# Patient Record
Sex: FEMALE | Race: WHITE | NOT HISPANIC OR LATINO
[De-identification: names, ages, dates, MRNs, and addresses within clinical notes are randomized per-mention and may not be internally consistent; named-entity substitution may affect disease eponyms.]

---

## 2019-01-17 ENCOUNTER — RECORD ABSTRACTING (OUTPATIENT)
Age: 59
End: 2019-01-17

## 2019-01-17 DIAGNOSIS — Z82.49 FAMILY HISTORY OF ISCHEMIC HEART DISEASE AND OTHER DISEASES OF THE CIRCULATORY SYSTEM: ICD-10-CM

## 2019-01-17 DIAGNOSIS — H40.20X0 UNSPECIFIED PRIMARY ANGLE-CLOSURE GLAUCOMA, STAGE UNSPECIFIED: ICD-10-CM

## 2019-01-17 DIAGNOSIS — G47.00 INSOMNIA, UNSPECIFIED: ICD-10-CM

## 2019-01-17 DIAGNOSIS — Z87.440 PERSONAL HISTORY OF URINARY (TRACT) INFECTIONS: ICD-10-CM

## 2019-01-17 DIAGNOSIS — M25.559 PAIN IN UNSPECIFIED HIP: ICD-10-CM

## 2019-01-17 DIAGNOSIS — Z82.3 FAMILY HISTORY OF STROKE: ICD-10-CM

## 2019-01-17 DIAGNOSIS — D17.30 BENIGN LIPOMATOUS NEOPLASM OF SKIN AND SUBCUTANEOUS TISSUE OF UNSPECIFIED SITES: ICD-10-CM

## 2019-01-18 ENCOUNTER — APPOINTMENT (OUTPATIENT)
Dept: INTERNAL MEDICINE | Facility: CLINIC | Age: 59
End: 2019-01-18
Payer: COMMERCIAL

## 2019-01-18 VITALS — WEIGHT: 138 LBS | HEIGHT: 64 IN | BODY MASS INDEX: 23.56 KG/M2

## 2019-01-18 DIAGNOSIS — L98.7 EXCESSIVE AND REDUNDANT SKIN AND SUBCUTANEOUS TISSUE: ICD-10-CM

## 2019-01-18 DIAGNOSIS — H54.7 UNSPECIFIED VISUAL LOSS: ICD-10-CM

## 2019-01-18 DIAGNOSIS — Z01.818 ENCOUNTER FOR OTHER PREPROCEDURAL EXAMINATION: ICD-10-CM

## 2019-01-18 PROCEDURE — 99244 OFF/OP CNSLTJ NEW/EST MOD 40: CPT

## 2019-01-18 RX ORDER — ALENDRONATE SODIUM 70 MG/1
70 TABLET ORAL
Refills: 0 | Status: COMPLETED | COMMUNITY
End: 2019-01-18

## 2019-01-20 ENCOUNTER — RX RENEWAL (OUTPATIENT)
Age: 59
End: 2019-01-20

## 2019-07-21 ENCOUNTER — APPOINTMENT (OUTPATIENT)
Dept: INTERNAL MEDICINE | Facility: CLINIC | Age: 59
End: 2019-07-21
Payer: COMMERCIAL

## 2019-07-21 PROCEDURE — 99213 OFFICE O/P EST LOW 20 MIN: CPT

## 2019-07-21 RX ORDER — TOBRAMYCIN AND DEXAMETHASONE 3; 1 MG/G; MG/G
0.3-0.1 OINTMENT OPHTHALMIC
Qty: 4 | Refills: 0 | Status: DISCONTINUED | COMMUNITY
Start: 2019-01-24

## 2019-07-21 NOTE — HISTORY OF PRESENT ILLNESS
[FreeTextEntry1] : last  3 days has pulling  feeling over bladder area and has urunary frequency.  no def dysuria no fever no cva tenderness

## 2019-07-21 NOTE — PHYSICAL EXAM
[No Acute Distress] : no acute distress [Well Nourished] : well nourished [Well Developed] : well developed [No JVD] : no jugular venous distention [No Respiratory Distress] : no respiratory distress  [No Accessory Muscle Use] : no accessory muscle use [Clear to Auscultation] : lungs were clear to auscultation bilaterally [Normal Rate] : normal rate  [Regular Rhythm] : with a regular rhythm [Normal S1, S2] : normal S1 and S2 [Soft] : abdomen soft [Non Tender] : non-tender [Normal] : affect was normal and insight and judgment were intact [No CVA Tenderness] : no CVA  tenderness

## 2019-07-22 ENCOUNTER — TRANSCRIPTION ENCOUNTER (OUTPATIENT)
Age: 59
End: 2019-07-22

## 2019-07-24 LAB — BACTERIA UR CULT: ABNORMAL

## 2019-08-06 ENCOUNTER — MEDICATION RENEWAL (OUTPATIENT)
Age: 59
End: 2019-08-06

## 2020-08-18 ENCOUNTER — APPOINTMENT (OUTPATIENT)
Dept: INTERNAL MEDICINE | Facility: CLINIC | Age: 60
End: 2020-08-18
Payer: COMMERCIAL

## 2020-08-18 VITALS — BODY MASS INDEX: 22.71 KG/M2 | HEIGHT: 64 IN | HEART RATE: 83 BPM | OXYGEN SATURATION: 98 % | WEIGHT: 133 LBS

## 2020-08-18 VITALS — DIASTOLIC BLOOD PRESSURE: 76 MMHG | SYSTOLIC BLOOD PRESSURE: 120 MMHG

## 2020-08-18 PROCEDURE — 99214 OFFICE O/P EST MOD 30 MIN: CPT

## 2020-08-18 RX ORDER — NITROFURANTOIN MACROCRYSTALS 100 MG/1
100 CAPSULE ORAL
Qty: 180 | Refills: 0 | Status: COMPLETED | COMMUNITY
Start: 2019-07-21 | End: 2020-08-18

## 2020-08-18 NOTE — ASSESSMENT
[FreeTextEntry1] : normal exam\par hair thinning of uncertain cause\par possibly genetic/postmenopausal plus severe stress and lack of sleep in March 2020\par idiopathic lymphedema

## 2020-08-18 NOTE — PLAN
[FreeTextEntry1] : check bloods\par topical minoxidil\par derm follow up if no change for scalp biopsy

## 2020-08-18 NOTE — HISTORY OF PRESENT ILLNESS
[de-identified] : saw gyn, mentioned this, told it was stress\par saw lev Busch, he told her it could be systemic illness, recommended Rogaine\par postmenopausal 3 to 4 years ago\par had tremendous stress in March\par ROS negative [FreeTextEntry1] : hair thinning and hair loss

## 2020-08-18 NOTE — PHYSICAL EXAM
[No Acute Distress] : no acute distress [Well Developed] : well developed [Well-Appearing] : well-appearing [Well Nourished] : well nourished [PERRL] : pupils equal round and reactive to light [Normal Sclera/Conjunctiva] : normal sclera/conjunctiva [EOMI] : extraocular movements intact [Normal Outer Ear/Nose] : the outer ears and nose were normal in appearance [Normal Oropharynx] : the oropharynx was normal [No JVD] : no jugular venous distention [No Lymphadenopathy] : no lymphadenopathy [No Respiratory Distress] : no respiratory distress  [Thyroid Normal, No Nodules] : the thyroid was normal and there were no nodules present [No Accessory Muscle Use] : no accessory muscle use [Supple] : supple [Clear to Auscultation] : lungs were clear to auscultation bilaterally [Regular Rhythm] : with a regular rhythm [Normal Rate] : normal rate  [Normal S1, S2] : normal S1 and S2 [No Murmur] : no murmur heard [No Carotid Bruits] : no carotid bruits [Pedal Pulses Present] : the pedal pulses are present [No Abdominal Bruit] : a ~M bruit was not heard ~T in the abdomen [No Varicosities] : no varicosities [No Edema] : there was no peripheral edema [Soft] : abdomen soft [No Extremity Clubbing/Cyanosis] : no extremity clubbing/cyanosis [No Palpable Aorta] : no palpable aorta [Non Tender] : non-tender [No Masses] : no abdominal mass palpated [Non-distended] : non-distended [Normal Bowel Sounds] : normal bowel sounds [No HSM] : no HSM [Normal Supraclavicular Nodes] : no supraclavicular lymphadenopathy [Normal Anterior Cervical Nodes] : no anterior cervical lymphadenopathy [Normal Axillary Nodes] : no axillary lymphadenopathy [Normal Posterior Cervical Nodes] : no posterior cervical lymphadenopathy [Normal Femoral Nodes] : no femoral lymphadenopathy [No CVA Tenderness] : no CVA  tenderness [Normal Inguinal Nodes] : no inguinal lymphadenopathy [No Spinal Tenderness] : no spinal tenderness [No Joint Swelling] : no joint swelling [No Rash] : no rash [Grossly Normal Strength/Tone] : grossly normal strength/tone [Coordination Grossly Intact] : coordination grossly intact [No Focal Deficits] : no focal deficits [Normal Affect] : the affect was normal [Normal Gait] : normal gait [Normal Insight/Judgement] : insight and judgment were intact [de-identified] : no alopecia

## 2020-08-21 LAB
25(OH)D3 SERPL-MCNC: 36.8 NG/ML
ALBUMIN SERPL ELPH-MCNC: 5.2 G/DL
ALP BLD-CCNC: 72 U/L
ALT SERPL-CCNC: 16 U/L
ANA SER IF-ACNC: NEGATIVE
ANACR T: NEGATIVE
ANION GAP SERPL CALC-SCNC: 12 MMOL/L
AST SERPL-CCNC: 19 U/L
BASOPHILS # BLD AUTO: 0.04 K/UL
BASOPHILS NFR BLD AUTO: 0.6 %
BILIRUB SERPL-MCNC: 0.3 MG/DL
BUN SERPL-MCNC: 17 MG/DL
CALCIUM SERPL-MCNC: 10 MG/DL
CHLORIDE SERPL-SCNC: 102 MMOL/L
CO2 SERPL-SCNC: 27 MMOL/L
CREAT SERPL-MCNC: 0.69 MG/DL
EOSINOPHIL # BLD AUTO: 0.13 K/UL
EOSINOPHIL NFR BLD AUTO: 1.9 %
ESTIMATED AVERAGE GLUCOSE: 114 MG/DL
GLUCOSE SERPL-MCNC: 100 MG/DL
HBA1C MFR BLD HPLC: 5.6 %
HCT VFR BLD CALC: 47.2 %
HGB BLD-MCNC: 14.8 G/DL
IMM GRANULOCYTES NFR BLD AUTO: 0.3 %
IRON SATN MFR SERPL: 31 %
IRON SERPL-MCNC: 108 UG/DL
LYMPHOCYTES # BLD AUTO: 2.59 K/UL
LYMPHOCYTES NFR BLD AUTO: 37.5 %
MAN DIFF?: NORMAL
MCHC RBC-ENTMCNC: 29.1 PG
MCHC RBC-ENTMCNC: 31.4 GM/DL
MCV RBC AUTO: 92.9 FL
MONOCYTES # BLD AUTO: 0.69 K/UL
MONOCYTES NFR BLD AUTO: 10 %
NEUTROPHILS # BLD AUTO: 3.43 K/UL
NEUTROPHILS NFR BLD AUTO: 49.7 %
PLATELET # BLD AUTO: 302 K/UL
POTASSIUM SERPL-SCNC: 4.8 MMOL/L
PROT SERPL-MCNC: 6.9 G/DL
RBC # BLD: 5.08 M/UL
RBC # FLD: 12.7 %
SODIUM SERPL-SCNC: 141 MMOL/L
T4 FREE SERPL-MCNC: 1.1 NG/DL
TIBC SERPL-MCNC: 350 UG/DL
UIBC SERPL-MCNC: 242 UG/DL
VIT B12 SERPL-MCNC: 442 PG/ML
WBC # FLD AUTO: 6.9 K/UL

## 2020-09-01 LAB
TESTOST BND SERPL-MCNC: 1.8 PG/ML
TESTOST SERPL-MCNC: 19.3 NG/DL

## 2020-10-15 ENCOUNTER — NON-APPOINTMENT (OUTPATIENT)
Age: 60
End: 2020-10-15

## 2020-10-15 ENCOUNTER — APPOINTMENT (OUTPATIENT)
Dept: INTERNAL MEDICINE | Facility: CLINIC | Age: 60
End: 2020-10-15
Payer: COMMERCIAL

## 2020-10-15 VITALS — HEIGHT: 62.5 IN | WEIGHT: 133 LBS | BODY MASS INDEX: 23.86 KG/M2

## 2020-10-15 VITALS — SYSTOLIC BLOOD PRESSURE: 118 MMHG | DIASTOLIC BLOOD PRESSURE: 76 MMHG

## 2020-10-15 DIAGNOSIS — Z23 ENCOUNTER FOR IMMUNIZATION: ICD-10-CM

## 2020-10-15 PROCEDURE — 93000 ELECTROCARDIOGRAM COMPLETE: CPT

## 2020-10-15 PROCEDURE — 81003 URINALYSIS AUTO W/O SCOPE: CPT | Mod: QW

## 2020-10-15 PROCEDURE — 36415 COLL VENOUS BLD VENIPUNCTURE: CPT

## 2020-10-15 PROCEDURE — 99396 PREV VISIT EST AGE 40-64: CPT | Mod: 25

## 2020-10-15 RX ORDER — FLUCONAZOLE 200 MG/1
200 TABLET ORAL
Qty: 1 | Refills: 0 | Status: COMPLETED | COMMUNITY
Start: 2019-07-21 | End: 2020-10-15

## 2020-10-15 NOTE — HISTORY OF PRESENT ILLNESS
[de-identified] : hair loss worked up here and at dermatologist\par telogen effluvium on scalp biopsy\par using minoxidil topical\par left ear fluttering, worse in afternoon or evening\par One history of lymphedema for which she wears support stockings and gets compression treatments by a lymphedema specialist\par Past history melanoma closely followed by dermatologist\par Regular gynecologic exams with \par hx microscopic hematuria negative urologic workup 2016 [FreeTextEntry1] : here for comprehensive examination and with multiple complaints

## 2020-10-15 NOTE — HEALTH RISK ASSESSMENT
[0] : 2) Feeling down, depressed, or hopeless: Not at all (0) [Patient reported mammogram was normal] : Patient reported mammogram was normal [Patient reported colonoscopy was normal] : Patient reported colonoscopy was normal [Patient reported PAP Smear was normal] : Patient reported PAP Smear was normal [Patient reported bone density results were abnormal] : Patient reported bone density results were abnormal [MammogramDate] : 8/2020 [[id number]] : 0 [ColonoscopyDate] : 3/2014 [BoneDensityDate] : 8/2019 [PapSmearDate] : 8/2020

## 2020-10-15 NOTE — ASSESSMENT
[FreeTextEntry1] : healthy well managed lymphedema\par Some improvement of her thinning with topical minoxidil but side effect of some facial hair required her to reduce the dose\par Scalp biopsy confirmed diagnosis

## 2020-10-15 NOTE — PHYSICAL EXAM
[Well Nourished] : well nourished [Well Developed] : well developed [No Acute Distress] : no acute distress [Normal Sclera/Conjunctiva] : normal sclera/conjunctiva [Well-Appearing] : well-appearing [Normal Outer Ear/Nose] : the outer ears and nose were normal in appearance [Supple] : supple [No JVD] : no jugular venous distention [No Lymphadenopathy] : no lymphadenopathy [Thyroid Normal, No Nodules] : the thyroid was normal and there were no nodules present [No Respiratory Distress] : no respiratory distress  [Clear to Auscultation] : lungs were clear to auscultation bilaterally [No Accessory Muscle Use] : no accessory muscle use [Normal Rate] : normal rate  [Normal S1, S2] : normal S1 and S2 [Regular Rhythm] : with a regular rhythm [No Murmur] : no murmur heard [No Carotid Bruits] : no carotid bruits [No Abdominal Bruit] : a ~M bruit was not heard ~T in the abdomen [No Varicosities] : no varicosities [Pedal Pulses Present] : the pedal pulses are present [No Edema] : there was no peripheral edema [No Palpable Aorta] : no palpable aorta [No Extremity Clubbing/Cyanosis] : no extremity clubbing/cyanosis [Soft] : abdomen soft [Non-distended] : non-distended [Non Tender] : non-tender [No Masses] : no abdominal mass palpated [No HSM] : no HSM [Normal Bowel Sounds] : normal bowel sounds [Normal Supraclavicular Nodes] : no supraclavicular lymphadenopathy [Normal Axillary Nodes] : no axillary lymphadenopathy [Normal Inguinal Nodes] : no inguinal lymphadenopathy [Normal Posterior Cervical Nodes] : no posterior cervical lymphadenopathy [Normal Anterior Cervical Nodes] : no anterior cervical lymphadenopathy [No Spinal Tenderness] : no spinal tenderness [No CVA Tenderness] : no CVA  tenderness [Normal Femoral Nodes] : no femoral lymphadenopathy [No Joint Swelling] : no joint swelling [No Rash] : no rash [Grossly Normal Strength/Tone] : grossly normal strength/tone [Normal Gait] : normal gait [Coordination Grossly Intact] : coordination grossly intact [No Focal Deficits] : no focal deficits [Normal Affect] : the affect was normal [Normal Insight/Judgement] : insight and judgment were intact [de-identified] : no alopecia

## 2020-10-16 LAB
25(OH)D3 SERPL-MCNC: 31.2 NG/ML
ALBUMIN SERPL ELPH-MCNC: 4.8 G/DL
ALP BLD-CCNC: 90 U/L
ALT SERPL-CCNC: 85 U/L
ANION GAP SERPL CALC-SCNC: 13 MMOL/L
APPEARANCE: CLEAR
AST SERPL-CCNC: 44 U/L
BACTERIA: NEGATIVE
BASOPHILS # BLD AUTO: 0.03 K/UL
BASOPHILS NFR BLD AUTO: 0.6 %
BILIRUB SERPL-MCNC: 0.4 MG/DL
BILIRUBIN URINE: NEGATIVE
BLOOD URINE: NEGATIVE
BUN SERPL-MCNC: 9 MG/DL
CALCIUM SERPL-MCNC: 9.6 MG/DL
CHLORIDE SERPL-SCNC: 100 MMOL/L
CHOLEST SERPL-MCNC: 202 MG/DL
CHOLEST/HDLC SERPL: 2.6 RATIO
CK SERPL-CCNC: 52 U/L
CO2 SERPL-SCNC: 26 MMOL/L
COLOR: NORMAL
CREAT SERPL-MCNC: 0.62 MG/DL
EOSINOPHIL # BLD AUTO: 0.13 K/UL
EOSINOPHIL NFR BLD AUTO: 2.5 %
ESTIMATED AVERAGE GLUCOSE: 120 MG/DL
GLUCOSE QUALITATIVE U: NEGATIVE
GLUCOSE SERPL-MCNC: 98 MG/DL
HBA1C MFR BLD HPLC: 5.8 %
HCT VFR BLD CALC: 44.8 %
HDLC SERPL-MCNC: 76 MG/DL
HGB BLD-MCNC: 14 G/DL
HYALINE CASTS: 0 /LPF
IMM GRANULOCYTES NFR BLD AUTO: 0.4 %
KETONES URINE: NEGATIVE
LDLC SERPL CALC-MCNC: 102 MG/DL
LEUKOCYTE ESTERASE URINE: NEGATIVE
LYMPHOCYTES # BLD AUTO: 1.86 K/UL
LYMPHOCYTES NFR BLD AUTO: 35.6 %
MAN DIFF?: NORMAL
MCHC RBC-ENTMCNC: 28.7 PG
MCHC RBC-ENTMCNC: 31.3 GM/DL
MCV RBC AUTO: 92 FL
MICROSCOPIC-UA: NORMAL
MONOCYTES # BLD AUTO: 0.42 K/UL
MONOCYTES NFR BLD AUTO: 8 %
NEUTROPHILS # BLD AUTO: 2.76 K/UL
NEUTROPHILS NFR BLD AUTO: 52.9 %
NITRITE URINE: NEGATIVE
PH URINE: 7
PLATELET # BLD AUTO: 334 K/UL
POTASSIUM SERPL-SCNC: 4.5 MMOL/L
PROT SERPL-MCNC: 6.9 G/DL
PROTEIN URINE: NEGATIVE
RBC # BLD: 4.87 M/UL
RBC # FLD: 12.7 %
RED BLOOD CELLS URINE: 1 /HPF
SARS-COV-2 IGG SERPL IA-ACNC: <0.1 INDEX
SARS-COV-2 IGG SERPL QL IA: NEGATIVE
SODIUM SERPL-SCNC: 140 MMOL/L
SPECIFIC GRAVITY URINE: 1.01
SQUAMOUS EPITHELIAL CELLS: 0 /HPF
T4 FREE SERPL-MCNC: 1 NG/DL
TRIGL SERPL-MCNC: 121 MG/DL
TSH SERPL-ACNC: 4.39 UIU/ML
UROBILINOGEN URINE: NORMAL
VIT B12 SERPL-MCNC: 485 PG/ML
WBC # FLD AUTO: 5.22 K/UL
WHITE BLOOD CELLS URINE: 1 /HPF

## 2020-10-19 LAB
ALBUMIN SERPL ELPH-MCNC: 4.8 G/DL
ALP BLD-CCNC: 91 U/L
ALT SERPL-CCNC: 81 U/L
ANION GAP SERPL CALC-SCNC: 22 MMOL/L
AST SERPL-CCNC: 52 U/L
BILIRUB SERPL-MCNC: 0.3 MG/DL
BUN SERPL-MCNC: 9 MG/DL
CALCIUM SERPL-MCNC: 10 MG/DL
CHLORIDE SERPL-SCNC: 102 MMOL/L
CO2 SERPL-SCNC: 20 MMOL/L
CREAT SERPL-MCNC: 0.63 MG/DL
GLUCOSE SERPL-MCNC: 104 MG/DL
HAV IGM SER QL: NONREACTIVE
HBV CORE IGM SER QL: NONREACTIVE
HBV SURFACE AG SER QL: NONREACTIVE
HCV AB SER QL: NONREACTIVE
HCV S/CO RATIO: 0.07 S/CO
POTASSIUM SERPL-SCNC: 4.7 MMOL/L
PROT SERPL-MCNC: 7 G/DL
SODIUM SERPL-SCNC: 144 MMOL/L

## 2020-11-13 ENCOUNTER — LABORATORY RESULT (OUTPATIENT)
Age: 60
End: 2020-11-13

## 2022-01-21 ENCOUNTER — NON-APPOINTMENT (OUTPATIENT)
Age: 62
End: 2022-01-21

## 2022-01-21 ENCOUNTER — APPOINTMENT (OUTPATIENT)
Dept: INTERNAL MEDICINE | Facility: CLINIC | Age: 62
End: 2022-01-21
Payer: COMMERCIAL

## 2022-01-21 VITALS — SYSTOLIC BLOOD PRESSURE: 122 MMHG | DIASTOLIC BLOOD PRESSURE: 80 MMHG

## 2022-01-21 VITALS
HEART RATE: 60 BPM | DIASTOLIC BLOOD PRESSURE: 88 MMHG | WEIGHT: 133 LBS | SYSTOLIC BLOOD PRESSURE: 144 MMHG | HEIGHT: 62.5 IN | BODY MASS INDEX: 23.86 KG/M2 | TEMPERATURE: 97.7 F | OXYGEN SATURATION: 98 %

## 2022-01-21 DIAGNOSIS — R31.29 OTHER MICROSCOPIC HEMATURIA: ICD-10-CM

## 2022-01-21 LAB
BILIRUB UR QL STRIP: NEGATIVE
CLARITY UR: CLEAR
COLLECTION METHOD: NORMAL
GLUCOSE UR-MCNC: NEGATIVE
HCG UR QL: 0.2 EU/DL
HGB UR QL STRIP.AUTO: NORMAL
KETONES UR-MCNC: NEGATIVE
LEUKOCYTE ESTERASE UR QL STRIP: NORMAL
NITRITE UR QL STRIP: NEGATIVE
PH UR STRIP: 7
PROT UR STRIP-MCNC: NEGATIVE
SP GR UR STRIP: 1.01

## 2022-01-21 PROCEDURE — 99396 PREV VISIT EST AGE 40-64: CPT | Mod: 25

## 2022-01-21 PROCEDURE — 81003 URINALYSIS AUTO W/O SCOPE: CPT | Mod: QW

## 2022-01-21 PROCEDURE — G0442 ANNUAL ALCOHOL SCREEN 15 MIN: CPT

## 2022-01-21 PROCEDURE — G0444 DEPRESSION SCREEN ANNUAL: CPT | Mod: 59

## 2022-01-21 PROCEDURE — 93000 ELECTROCARDIOGRAM COMPLETE: CPT | Mod: 59

## 2022-01-21 PROCEDURE — 36415 COLL VENOUS BLD VENIPUNCTURE: CPT

## 2022-01-21 RX ORDER — ALENDRONATE SODIUM 70 MG/1
70 TABLET ORAL
Qty: 13 | Refills: 3 | Status: ACTIVE | COMMUNITY
Start: 2022-01-21

## 2022-01-21 NOTE — REVIEW OF SYSTEMS
[Insomnia] : insomnia [Negative] : Heme/Lymph [Itching] : no itching [Suicidal] : not suicidal [Anxiety] : no anxiety [Depression] : no depression [de-identified] : hair loss/thinning

## 2022-01-21 NOTE — HISTORY OF PRESENT ILLNESS
[FreeTextEntry1] : here for complete exam [de-identified] : hair loss worked up  at dermatologist\par telogen effluvium on scalp biopsy\par using minoxidil topical, stopped a month ago\par left ear fluttering, worse in afternoon or evening\par lymphedema for which she wears support stockings and gets compression treatments by a lymphedema specialist\par Past history melanoma closely followed by dermatologist yearly on time\par osteoporosis on alendronate by gyn\par Regular gynecologic exams with \par hx microscopic hematuria negative urologic workup 2016\par stress level high with work

## 2022-01-21 NOTE — HEALTH RISK ASSESSMENT
[Never] : Never [0] : 2) Feeling down, depressed, or hopeless: Not at all (0) [PHQ-2 Negative - No further assessment needed] : PHQ-2 Negative - No further assessment needed [Patient reported mammogram was normal] : Patient reported mammogram was normal [Patient reported PAP Smear was normal] : Patient reported PAP Smear was normal [Patient reported bone density results were abnormal] : Patient reported bone density results were abnormal [Patient reported colonoscopy was normal] : Patient reported colonoscopy was normal [2 - 3 times a week (3 pts)] : 2 - 3  times a week (3 points) [1 or 2 (0 pts)] : 1 or 2 (0 points) [Never (0 pts)] : Never (0 points) [Audit-CScore] : 3 [[id number]] : 0 [MammogramDate] : 8/2021 [PapSmearDate] : 8/2021 [BoneDensityDate] : 8/2021 [BoneDensityComments] : osteoporosis [ColonoscopyDate] : 3/2014

## 2022-01-21 NOTE — PHYSICAL EXAM
[Well Nourished] : well nourished [Well Developed] : well developed [Well-Appearing] : well-appearing [Normal Sclera/Conjunctiva] : normal sclera/conjunctiva [Normal Outer Ear/Nose] : the outer ears and nose were normal in appearance [No JVD] : no jugular venous distention [No Lymphadenopathy] : no lymphadenopathy [Supple] : supple [Thyroid Normal, No Nodules] : the thyroid was normal and there were no nodules present [No Respiratory Distress] : no respiratory distress  [No Accessory Muscle Use] : no accessory muscle use [Clear to Auscultation] : lungs were clear to auscultation bilaterally [Normal Rate] : normal rate  [Regular Rhythm] : with a regular rhythm [Normal S1, S2] : normal S1 and S2 [No Murmur] : no murmur heard [No Carotid Bruits] : no carotid bruits [No Abdominal Bruit] : a ~M bruit was not heard ~T in the abdomen [No Varicosities] : no varicosities [Pedal Pulses Present] : the pedal pulses are present [No Edema] : there was no peripheral edema [No Palpable Aorta] : no palpable aorta [No Extremity Clubbing/Cyanosis] : no extremity clubbing/cyanosis [Soft] : abdomen soft [Non Tender] : non-tender [Non-distended] : non-distended [No Masses] : no abdominal mass palpated [No HSM] : no HSM [Normal Bowel Sounds] : normal bowel sounds [Normal Supraclavicular Nodes] : no supraclavicular lymphadenopathy [Normal Axillary Nodes] : no axillary lymphadenopathy [Normal Posterior Cervical Nodes] : no posterior cervical lymphadenopathy [Normal Anterior Cervical Nodes] : no anterior cervical lymphadenopathy [Normal Inguinal Nodes] : no inguinal lymphadenopathy [Normal Femoral Nodes] : no femoral lymphadenopathy [No CVA Tenderness] : no CVA  tenderness [No Spinal Tenderness] : no spinal tenderness [No Joint Swelling] : no joint swelling [Grossly Normal Strength/Tone] : grossly normal strength/tone [No Rash] : no rash [Coordination Grossly Intact] : coordination grossly intact [No Focal Deficits] : no focal deficits [Normal Gait] : normal gait [Normal Affect] : the affect was normal [Normal Insight/Judgement] : insight and judgment were intact [de-identified] : no alopecia

## 2022-01-23 LAB
25(OH)D3 SERPL-MCNC: 36.7 NG/ML
ALBUMIN SERPL ELPH-MCNC: 5.1 G/DL
ALP BLD-CCNC: 66 U/L
ALT SERPL-CCNC: 28 U/L
ANION GAP SERPL CALC-SCNC: 11 MMOL/L
APPEARANCE: CLEAR
AST SERPL-CCNC: 25 U/L
BACTERIA: NEGATIVE
BASOPHILS # BLD AUTO: 0.02 K/UL
BASOPHILS NFR BLD AUTO: 0.4 %
BILIRUB SERPL-MCNC: 0.5 MG/DL
BILIRUBIN URINE: NEGATIVE
BLOOD URINE: NEGATIVE
BUN SERPL-MCNC: 9 MG/DL
CALCIUM SERPL-MCNC: 9.9 MG/DL
CHLORIDE SERPL-SCNC: 102 MMOL/L
CHOLEST SERPL-MCNC: 200 MG/DL
CK SERPL-CCNC: 62 U/L
CO2 SERPL-SCNC: 27 MMOL/L
COLOR: NORMAL
CREAT SERPL-MCNC: 0.65 MG/DL
EOSINOPHIL # BLD AUTO: 0.05 K/UL
EOSINOPHIL NFR BLD AUTO: 1 %
ESTIMATED AVERAGE GLUCOSE: 117 MG/DL
GLUCOSE QUALITATIVE U: NEGATIVE
GLUCOSE SERPL-MCNC: 106 MG/DL
HBA1C MFR BLD HPLC: 5.7 %
HCT VFR BLD CALC: 46 %
HDLC SERPL-MCNC: 73 MG/DL
HGB BLD-MCNC: 14.4 G/DL
HYALINE CASTS: 0 /LPF
IMM GRANULOCYTES NFR BLD AUTO: 0.2 %
KETONES URINE: NEGATIVE
LDLC SERPL CALC-MCNC: 110 MG/DL
LEUKOCYTE ESTERASE URINE: ABNORMAL
LYMPHOCYTES # BLD AUTO: 1.89 K/UL
LYMPHOCYTES NFR BLD AUTO: 37.9 %
MAN DIFF?: NORMAL
MCHC RBC-ENTMCNC: 28.8 PG
MCHC RBC-ENTMCNC: 31.3 GM/DL
MCV RBC AUTO: 92 FL
MICROSCOPIC-UA: NORMAL
MONOCYTES # BLD AUTO: 0.45 K/UL
MONOCYTES NFR BLD AUTO: 9 %
NEUTROPHILS # BLD AUTO: 2.57 K/UL
NEUTROPHILS NFR BLD AUTO: 51.5 %
NITRITE URINE: NEGATIVE
NONHDLC SERPL-MCNC: 127 MG/DL
PH URINE: 7
PLATELET # BLD AUTO: 317 K/UL
POTASSIUM SERPL-SCNC: 5.1 MMOL/L
PROT SERPL-MCNC: 7.2 G/DL
PROTEIN URINE: NEGATIVE
RBC # BLD: 5 M/UL
RBC # FLD: 12.5 %
RED BLOOD CELLS URINE: 1 /HPF
SODIUM SERPL-SCNC: 140 MMOL/L
SPECIFIC GRAVITY URINE: 1.01
SQUAMOUS EPITHELIAL CELLS: 0 /HPF
T4 FREE SERPL-MCNC: 1.2 NG/DL
TRIGL SERPL-MCNC: 88 MG/DL
TSH SERPL-ACNC: 3.59 UIU/ML
UROBILINOGEN URINE: NORMAL
VIT B12 SERPL-MCNC: 649 PG/ML
WBC # FLD AUTO: 4.99 K/UL
WHITE BLOOD CELLS URINE: 1 /HPF

## 2022-01-27 ENCOUNTER — NON-APPOINTMENT (OUTPATIENT)
Age: 62
End: 2022-01-27

## 2022-01-27 LAB
TESTOST FREE SERPL-MCNC: 3.5 PG/ML
TESTOST SERPL-MCNC: 19.3 NG/DL

## 2022-03-19 ENCOUNTER — RX RENEWAL (OUTPATIENT)
Age: 62
End: 2022-03-19

## 2023-01-27 ENCOUNTER — NON-APPOINTMENT (OUTPATIENT)
Age: 63
End: 2023-01-27

## 2023-01-27 ENCOUNTER — APPOINTMENT (OUTPATIENT)
Dept: INTERNAL MEDICINE | Facility: CLINIC | Age: 63
End: 2023-01-27
Payer: COMMERCIAL

## 2023-01-27 VITALS
BODY MASS INDEX: 24.22 KG/M2 | TEMPERATURE: 97.8 F | HEIGHT: 62.5 IN | HEART RATE: 71 BPM | OXYGEN SATURATION: 99 % | WEIGHT: 135 LBS

## 2023-01-27 VITALS — SYSTOLIC BLOOD PRESSURE: 128 MMHG | DIASTOLIC BLOOD PRESSURE: 84 MMHG

## 2023-01-27 DIAGNOSIS — R76.0 RAISED ANTIBODY TITER: ICD-10-CM

## 2023-01-27 DIAGNOSIS — M81.0 AGE-RELATED OSTEOPOROSIS W/OUT CURRENT PATHOLOGICAL FRACTURE: ICD-10-CM

## 2023-01-27 DIAGNOSIS — R92.2 INCONCLUSIVE MAMMOGRAM: ICD-10-CM

## 2023-01-27 DIAGNOSIS — L65.0 TELOGEN EFFLUVIUM: ICD-10-CM

## 2023-01-27 PROCEDURE — 99396 PREV VISIT EST AGE 40-64: CPT | Mod: 25

## 2023-01-27 PROCEDURE — 36415 COLL VENOUS BLD VENIPUNCTURE: CPT

## 2023-01-27 PROCEDURE — G0444 DEPRESSION SCREEN ANNUAL: CPT | Mod: 59

## 2023-01-27 PROCEDURE — 81003 URINALYSIS AUTO W/O SCOPE: CPT | Mod: QW

## 2023-01-27 PROCEDURE — 93000 ELECTROCARDIOGRAM COMPLETE: CPT | Mod: 59

## 2023-01-27 NOTE — REVIEW OF SYSTEMS
[Insomnia] : insomnia [Negative] : Heme/Lymph [Itching] : no itching [Suicidal] : not suicidal [Anxiety] : no anxiety [Depression] : no depression [de-identified] : hair loss/thinning

## 2023-01-27 NOTE — HEALTH RISK ASSESSMENT
[Never] : Never [Never (0 pts)] : Never (0 points) [1 or 2 (0 pts)] : 1 or 2 (0 points) [0] : 2) Feeling down, depressed, or hopeless: Not at all (0) [PHQ-2 Negative - No further assessment needed] : PHQ-2 Negative - No further assessment needed [Patient reported mammogram was normal] : Patient reported mammogram was normal [Patient reported PAP Smear was normal] : Patient reported PAP Smear was normal [Patient reported bone density results were abnormal] : Patient reported bone density results were abnormal [Patient reported colonoscopy was normal] : Patient reported colonoscopy was normal [Yes] : Yes [4 or more  times a week (4 pts)] : 4 or more  times a week (4 points) [No falls in past year] : Patient reported no falls in the past year [Audit-CScore] : 4 [[id number]] : 0 [MammogramDate] : 8/2022 [PapSmearDate] : 8/2022 [BoneDensityDate] : 8/2022 [BoneDensityComments] : osteoporosis [ColonoscopyDate] : 3/2014

## 2023-01-27 NOTE — ASSESSMENT
[FreeTextEntry1] : healthy well managed lymphedema\par hair thinning stable\par DBP borderline Chonodrocutaneous Helical Advancement Flap Text: The defect edges were debeveled with a #15 scalpel blade.  Given the location of the defect and the proximity to free margins a chondrocutaneous helical advancement flap was deemed most appropriate.  Using a sterile surgical marker, the appropriate advancement flap was drawn incorporating the defect and placing the expected incisions within the relaxed skin tension lines where possible.    The area thus outlined was incised deep to adipose tissue with a #15 scalpel blade.  The skin margins were undermined to an appropriate distance in all directions utilizing iris scissors.

## 2023-01-27 NOTE — PHYSICAL EXAM
[Well Nourished] : well nourished [Well Developed] : well developed [Well-Appearing] : well-appearing [Normal Sclera/Conjunctiva] : normal sclera/conjunctiva [Normal Outer Ear/Nose] : the outer ears and nose were normal in appearance [No JVD] : no jugular venous distention [No Lymphadenopathy] : no lymphadenopathy [Supple] : supple [Thyroid Normal, No Nodules] : the thyroid was normal and there were no nodules present [No Respiratory Distress] : no respiratory distress  [No Accessory Muscle Use] : no accessory muscle use [Clear to Auscultation] : lungs were clear to auscultation bilaterally [Normal Rate] : normal rate  [Regular Rhythm] : with a regular rhythm [Normal S1, S2] : normal S1 and S2 [No Murmur] : no murmur heard [No Carotid Bruits] : no carotid bruits [No Abdominal Bruit] : a ~M bruit was not heard ~T in the abdomen [No Varicosities] : no varicosities [Pedal Pulses Present] : the pedal pulses are present [No Edema] : there was no peripheral edema [No Palpable Aorta] : no palpable aorta [No Extremity Clubbing/Cyanosis] : no extremity clubbing/cyanosis [Soft] : abdomen soft [Non Tender] : non-tender [Non-distended] : non-distended [No Masses] : no abdominal mass palpated [No HSM] : no HSM [Normal Bowel Sounds] : normal bowel sounds [Normal Supraclavicular Nodes] : no supraclavicular lymphadenopathy [Normal Axillary Nodes] : no axillary lymphadenopathy [Normal Posterior Cervical Nodes] : no posterior cervical lymphadenopathy [Normal Anterior Cervical Nodes] : no anterior cervical lymphadenopathy [Normal Inguinal Nodes] : no inguinal lymphadenopathy [Normal Femoral Nodes] : no femoral lymphadenopathy [No CVA Tenderness] : no CVA  tenderness [No Spinal Tenderness] : no spinal tenderness [No Joint Swelling] : no joint swelling [Grossly Normal Strength/Tone] : grossly normal strength/tone [No Rash] : no rash [Coordination Grossly Intact] : coordination grossly intact [No Focal Deficits] : no focal deficits [Normal Gait] : normal gait [Normal Affect] : the affect was normal [Normal Insight/Judgement] : insight and judgment were intact [de-identified] : no alopecia

## 2023-01-27 NOTE — HISTORY OF PRESENT ILLNESS
[FreeTextEntry1] : here for complete exam [de-identified] : hair loss worked up  at dermatologist\par telogen effluvium on scalp biopsy\par left ear fluttering, worse in afternoon or evening\par saw ENT, had MRI\par lymphedema for which she wears support stockings and gets compression treatments by a lymphedema specialist\par past melanoma closely followed by dermatologist yearly on time\par osteoporosis on alendronate by gyn\par Regular gynecologic exams with \par hx microscopic hematuria negative urologic workup 2016\par stress level high with work

## 2023-01-28 LAB
25(OH)D3 SERPL-MCNC: 32.7 NG/ML
ALBUMIN SERPL ELPH-MCNC: 4.6 G/DL
ALP BLD-CCNC: 52 U/L
ALT SERPL-CCNC: 32 U/L
ANION GAP SERPL CALC-SCNC: 11 MMOL/L
APPEARANCE: CLEAR
AST SERPL-CCNC: 24 U/L
BASOPHILS # BLD AUTO: 0.03 K/UL
BASOPHILS NFR BLD AUTO: 0.6 %
BILIRUB SERPL-MCNC: 0.4 MG/DL
BILIRUBIN URINE: NEGATIVE
BLOOD URINE: NEGATIVE
BUN SERPL-MCNC: 10 MG/DL
CALCIUM SERPL-MCNC: 9.4 MG/DL
CHLORIDE SERPL-SCNC: 102 MMOL/L
CHOLEST SERPL-MCNC: 206 MG/DL
CK SERPL-CCNC: 70 U/L
CO2 SERPL-SCNC: 25 MMOL/L
COLOR: NORMAL
CREAT SERPL-MCNC: 0.64 MG/DL
EGFR: 100 ML/MIN/1.73M2
EOSINOPHIL # BLD AUTO: 0.08 K/UL
EOSINOPHIL NFR BLD AUTO: 1.6 %
ESTIMATED AVERAGE GLUCOSE: 117 MG/DL
GLUCOSE QUALITATIVE U: NEGATIVE
GLUCOSE SERPL-MCNC: 103 MG/DL
HBA1C MFR BLD HPLC: 5.7 %
HCT VFR BLD CALC: 42 %
HDLC SERPL-MCNC: 77 MG/DL
HGB BLD-MCNC: 13.8 G/DL
IMM GRANULOCYTES NFR BLD AUTO: 0.6 %
KETONES URINE: NEGATIVE
LDLC SERPL CALC-MCNC: 106 MG/DL
LEUKOCYTE ESTERASE URINE: NEGATIVE
LYMPHOCYTES # BLD AUTO: 1.62 K/UL
LYMPHOCYTES NFR BLD AUTO: 31.6 %
MAN DIFF?: NORMAL
MCHC RBC-ENTMCNC: 29.5 PG
MCHC RBC-ENTMCNC: 32.9 GM/DL
MCV RBC AUTO: 89.7 FL
MONOCYTES # BLD AUTO: 0.4 K/UL
MONOCYTES NFR BLD AUTO: 7.8 %
NEUTROPHILS # BLD AUTO: 2.96 K/UL
NEUTROPHILS NFR BLD AUTO: 57.8 %
NITRITE URINE: NEGATIVE
NONHDLC SERPL-MCNC: 129 MG/DL
PH URINE: 7
PLATELET # BLD AUTO: 326 K/UL
POTASSIUM SERPL-SCNC: 3.9 MMOL/L
PROT SERPL-MCNC: 6.8 G/DL
PROTEIN URINE: NEGATIVE
RBC # BLD: 4.68 M/UL
RBC # FLD: 12.8 %
SODIUM SERPL-SCNC: 138 MMOL/L
SPECIFIC GRAVITY URINE: 1.01
T4 FREE SERPL-MCNC: 1.1 NG/DL
TRIGL SERPL-MCNC: 117 MG/DL
TSH SERPL-ACNC: 2.6 UIU/ML
UROBILINOGEN URINE: NORMAL
VIT B12 SERPL-MCNC: 501 PG/ML
WBC # FLD AUTO: 5.12 K/UL

## 2023-06-01 ENCOUNTER — APPOINTMENT (OUTPATIENT)
Dept: INTERNAL MEDICINE | Facility: CLINIC | Age: 63
End: 2023-06-01
Payer: COMMERCIAL

## 2023-06-01 VITALS — BODY MASS INDEX: 24.04 KG/M2 | WEIGHT: 134 LBS | OXYGEN SATURATION: 97 % | HEIGHT: 62.5 IN | HEART RATE: 62 BPM

## 2023-06-01 VITALS — DIASTOLIC BLOOD PRESSURE: 80 MMHG | SYSTOLIC BLOOD PRESSURE: 120 MMHG

## 2023-06-01 DIAGNOSIS — I89.0 LYMPHEDEMA, NOT ELSEWHERE CLASSIFIED: ICD-10-CM

## 2023-06-01 DIAGNOSIS — L65.9 NONSCARRING HAIR LOSS, UNSPECIFIED: ICD-10-CM

## 2023-06-01 PROCEDURE — 99213 OFFICE O/P EST LOW 20 MIN: CPT

## 2023-06-01 NOTE — HISTORY OF PRESENT ILLNESS
[FreeTextEntry1] : Here to follow elevated diastolic blood pressures on physical examination after 3 to 4 months of reducing alcohol and increasing exercise as well as other conditions [de-identified] : hair loss worked up  at dermatologist\par telogen effluvium on scalp biopsy\par left ear fluttering, worse in afternoon or evening\par saw ENT, had MRI\par lymphedema for which she wears support stockings and gets compression treatments by a lymphedema specialist\par past melanoma closely followed by dermatologist yearly on time\par osteoporosis on alendronate by gyn\par Regular gynecologic exams with \par hx microscopic hematuria negative urologic workup 2016\par lost job 2 days ago

## 2023-06-01 NOTE — PHYSICAL EXAM
[Well Nourished] : well nourished [Well Developed] : well developed [Well-Appearing] : well-appearing [Normal Sclera/Conjunctiva] : normal sclera/conjunctiva [Normal Outer Ear/Nose] : the outer ears and nose were normal in appearance [No JVD] : no jugular venous distention [No Lymphadenopathy] : no lymphadenopathy [Supple] : supple [Thyroid Normal, No Nodules] : the thyroid was normal and there were no nodules present [No Respiratory Distress] : no respiratory distress  [No Accessory Muscle Use] : no accessory muscle use [Clear to Auscultation] : lungs were clear to auscultation bilaterally [Normal Rate] : normal rate  [Regular Rhythm] : with a regular rhythm [Normal S1, S2] : normal S1 and S2 [No Murmur] : no murmur heard [No Carotid Bruits] : no carotid bruits [No Abdominal Bruit] : a ~M bruit was not heard ~T in the abdomen [No Varicosities] : no varicosities [Pedal Pulses Present] : the pedal pulses are present [No Palpable Aorta] : no palpable aorta [No Extremity Clubbing/Cyanosis] : no extremity clubbing/cyanosis [Soft] : abdomen soft [Non Tender] : non-tender [Non-distended] : non-distended [No Masses] : no abdominal mass palpated [No HSM] : no HSM [Normal Bowel Sounds] : normal bowel sounds [No CVA Tenderness] : no CVA  tenderness [No Spinal Tenderness] : no spinal tenderness [No Joint Swelling] : no joint swelling [Grossly Normal Strength/Tone] : grossly normal strength/tone [No Rash] : no rash [Coordination Grossly Intact] : coordination grossly intact [No Focal Deficits] : no focal deficits [Normal Gait] : normal gait [Normal Affect] : the affect was normal [Normal Insight/Judgement] : insight and judgment were intact [de-identified] : no alopecia

## 2023-06-01 NOTE — HEALTH RISK ASSESSMENT
[Yes] : Yes [4 or more  times a week (4 pts)] : 4 or more  times a week (4 points) [1 or 2 (0 pts)] : 1 or 2 (0 points) [Never (0 pts)] : Never (0 points) [Never] : Never

## 2023-06-08 ENCOUNTER — APPOINTMENT (OUTPATIENT)
Dept: INTERNAL MEDICINE | Facility: CLINIC | Age: 63
End: 2023-06-08

## 2023-09-14 ENCOUNTER — NON-APPOINTMENT (OUTPATIENT)
Age: 63
End: 2023-09-14

## 2023-09-15 DIAGNOSIS — R05.9 COUGH, UNSPECIFIED: ICD-10-CM

## 2023-10-20 ENCOUNTER — NON-APPOINTMENT (OUTPATIENT)
Age: 63
End: 2023-10-20

## 2023-10-20 RX ORDER — ESZOPICLONE 2 MG/1
2 TABLET, FILM COATED ORAL
Qty: 30 | Refills: 5 | Status: ACTIVE | COMMUNITY
Start: 1900-01-01 | End: 1900-01-01

## 2024-01-19 ENCOUNTER — APPOINTMENT (OUTPATIENT)
Dept: INTERNAL MEDICINE | Facility: CLINIC | Age: 64
End: 2024-01-19

## 2024-01-30 ENCOUNTER — NON-APPOINTMENT (OUTPATIENT)
Age: 64
End: 2024-01-30

## 2024-01-30 ENCOUNTER — APPOINTMENT (OUTPATIENT)
Dept: INTERNAL MEDICINE | Facility: CLINIC | Age: 64
End: 2024-01-30
Payer: COMMERCIAL

## 2024-01-30 VITALS — SYSTOLIC BLOOD PRESSURE: 108 MMHG | DIASTOLIC BLOOD PRESSURE: 80 MMHG

## 2024-01-30 VITALS
WEIGHT: 132 LBS | SYSTOLIC BLOOD PRESSURE: 157 MMHG | HEART RATE: 97 BPM | OXYGEN SATURATION: 97 % | BODY MASS INDEX: 23.68 KG/M2 | DIASTOLIC BLOOD PRESSURE: 89 MMHG | HEIGHT: 62.5 IN

## 2024-01-30 DIAGNOSIS — R10.9 UNSPECIFIED ABDOMINAL PAIN: ICD-10-CM

## 2024-01-30 DIAGNOSIS — K21.9 GASTRO-ESOPHAGEAL REFLUX DISEASE W/OUT ESOPHAGITIS: ICD-10-CM

## 2024-01-30 DIAGNOSIS — I10 ESSENTIAL (PRIMARY) HYPERTENSION: ICD-10-CM

## 2024-01-30 DIAGNOSIS — L40.9 PSORIASIS, UNSPECIFIED: ICD-10-CM

## 2024-01-30 DIAGNOSIS — R74.01 ELEVATION OF LEVELS OF LIVER TRANSAMINASE LEVELS: ICD-10-CM

## 2024-01-30 DIAGNOSIS — D03.9 MELANOMA IN SITU, UNSPECIFIED: ICD-10-CM

## 2024-01-30 DIAGNOSIS — Z00.00 ENCOUNTER FOR GENERAL ADULT MEDICAL EXAMINATION W/OUT ABNORMAL FINDINGS: ICD-10-CM

## 2024-01-30 PROCEDURE — 99396 PREV VISIT EST AGE 40-64: CPT

## 2024-01-30 PROCEDURE — 36415 COLL VENOUS BLD VENIPUNCTURE: CPT

## 2024-01-30 PROCEDURE — 99214 OFFICE O/P EST MOD 30 MIN: CPT | Mod: 25

## 2024-01-30 PROCEDURE — 93000 ELECTROCARDIOGRAM COMPLETE: CPT

## 2024-01-30 RX ORDER — FLUOCINONIDE 0.05 MG/G
0.05 OINTMENT TOPICAL
Qty: 1 | Refills: 3 | Status: ACTIVE | COMMUNITY
Start: 2024-01-30 | End: 1900-01-01

## 2024-01-30 NOTE — PLAN
[FreeTextEntry1] : fluocinonide ointment for elbow abdominal sono check bloods suggest EGD later in 3/2024 screening colonoscopy

## 2024-01-30 NOTE — HEALTH RISK ASSESSMENT
[Yes] : Yes [1 or 2 (0 pts)] : 1 or 2 (0 points) [Never (0 pts)] : Never (0 points) [No falls in past year] : Patient reported no falls in the past year [0] : 2) Feeling down, depressed, or hopeless: Not at all (0) [PHQ-2 Negative - No further assessment needed] : PHQ-2 Negative - No further assessment needed [Patient reported mammogram was normal] : Patient reported mammogram was normal [Patient reported PAP Smear was normal] : Patient reported PAP Smear was normal [Patient reported bone density results were abnormal] : Patient reported bone density results were abnormal [Patient reported colonoscopy was normal] : Patient reported colonoscopy was normal [2 - 3 times a week (3 pts)] : 2 - 3  times a week (3 points) [Audit-CScore] : 4 [[id number]] : 0 [MammogramDate] : 8/2023 [PapSmearDate] : 8/2023 [BoneDensityDate] : 8/2022 [BoneDensityComments] : osteoporosis [ColonoscopyDate] : 3/2014 [Never] : Never

## 2024-01-30 NOTE — PHYSICAL EXAM
[Well Nourished] : well nourished [Well Developed] : well developed [Well-Appearing] : well-appearing [Normal Sclera/Conjunctiva] : normal sclera/conjunctiva [Normal Outer Ear/Nose] : the outer ears and nose were normal in appearance [No JVD] : no jugular venous distention [No Lymphadenopathy] : no lymphadenopathy [Supple] : supple [Thyroid Normal, No Nodules] : the thyroid was normal and there were no nodules present [No Respiratory Distress] : no respiratory distress  [No Accessory Muscle Use] : no accessory muscle use [Clear to Auscultation] : lungs were clear to auscultation bilaterally [Normal Rate] : normal rate  [Regular Rhythm] : with a regular rhythm [Normal S1, S2] : normal S1 and S2 [No Murmur] : no murmur heard [No Carotid Bruits] : no carotid bruits [No Abdominal Bruit] : a ~M bruit was not heard ~T in the abdomen [No Varicosities] : no varicosities [Pedal Pulses Present] : the pedal pulses are present [No Palpable Aorta] : no palpable aorta [No Extremity Clubbing/Cyanosis] : no extremity clubbing/cyanosis [Soft] : abdomen soft [Non Tender] : non-tender [Non-distended] : non-distended [No Masses] : no abdominal mass palpated [No HSM] : no HSM [Normal Bowel Sounds] : normal bowel sounds [No Hernias] : no hernias [Epigastric] : not in the epigastric area [Periumbilical] : not periumbilical [Suprapubic] : not in the suprapubic area [RUQ] : not in the RUQ [RLQ] : not in the RLQ [LUQ] : not in the LUQ [LLQ] : not in the LLQ [Firm] : not firm [Rigid] : not rigid [Rebound] : no rebound [Guarding] : no guarding [No CVA Tenderness] : no CVA  tenderness [No Spinal Tenderness] : no spinal tenderness [Kyphosis] : no kyphosis [Scoliosis] : no scoliosis [No Joint Swelling] : no joint swelling [Grossly Normal Strength/Tone] : grossly normal strength/tone [Coordination Grossly Intact] : coordination grossly intact [No Focal Deficits] : no focal deficits [Normal Gait] : normal gait [Normal Affect] : the affect was normal [Normal Insight/Judgement] : insight and judgment were intact [de-identified] : one plaque left elbow

## 2024-01-30 NOTE — ASSESSMENT
[FreeTextEntry1] : healthy well managed lymphedema hair thinning stable DBP better upper abdomijnal pain ? GERD some ruq issues

## 2024-01-30 NOTE — HISTORY OF PRESENT ILLNESS
[FreeTextEntry1] : here for complete exam [de-identified] : 4 months upper epigastric pain, sometimews to RUQ with movement noted to have psoriasis or eczema on left elbow some improvement fluocinonide, not as good resonse to ointment by derm On last physical examination diastolic blood pressure was elevated On follow-up after exercise and reduction of alcohol diastolic blood pressure had come down to 80 hair loss worked up  at dermatologist telogen effluvium on scalp biopsy left ear fluttering, worse in afternoon or evening saw ENT, had MRI lymphedema for which she wears support stockings and gets compression treatments by a lymphedema specialist past melanoma closely followed by dermatologist yearly on time osteoporosis on alendronate by gyn Regular gynecologic exams with  hx microscopic hematuria negative urologic workup 2016 stress level high with work Insomnia given generic Lunesta by me, doing well 'it has saved my life'

## 2024-01-31 LAB
25(OH)D3 SERPL-MCNC: 31.6 NG/ML
ALBUMIN SERPL ELPH-MCNC: 4.8 G/DL
ALP BLD-CCNC: 60 U/L
ALT SERPL-CCNC: 26 U/L
ANION GAP SERPL CALC-SCNC: 10 MMOL/L
APPEARANCE: CLEAR
AST SERPL-CCNC: 21 U/L
BACTERIA: NEGATIVE /HPF
BASOPHILS # BLD AUTO: 0.05 K/UL
BASOPHILS NFR BLD AUTO: 0.9 %
BILIRUB SERPL-MCNC: 0.5 MG/DL
BILIRUBIN URINE: NEGATIVE
BLOOD URINE: NEGATIVE
BUN SERPL-MCNC: 7 MG/DL
CALCIUM SERPL-MCNC: 9.5 MG/DL
CAST: 0 /LPF
CHLORIDE SERPL-SCNC: 102 MMOL/L
CHOLEST SERPL-MCNC: 204 MG/DL
CO2 SERPL-SCNC: 27 MMOL/L
COLOR: YELLOW
CREAT SERPL-MCNC: 0.65 MG/DL
EGFR: 99 ML/MIN/1.73M2
EOSINOPHIL # BLD AUTO: 0.12 K/UL
EOSINOPHIL NFR BLD AUTO: 2.3 %
EPITHELIAL CELLS: 0 /HPF
ESTIMATED AVERAGE GLUCOSE: 114 MG/DL
GLUCOSE QUALITATIVE U: NEGATIVE MG/DL
GLUCOSE SERPL-MCNC: 100 MG/DL
HBA1C MFR BLD HPLC: 5.6 %
HCT VFR BLD CALC: 44.8 %
HDLC SERPL-MCNC: 67 MG/DL
HGB BLD-MCNC: 14.3 G/DL
IMM GRANULOCYTES NFR BLD AUTO: 0.2 %
KETONES URINE: NEGATIVE MG/DL
LDLC SERPL CALC-MCNC: 105 MG/DL
LEUKOCYTE ESTERASE URINE: NEGATIVE
LYMPHOCYTES # BLD AUTO: 1.97 K/UL
LYMPHOCYTES NFR BLD AUTO: 37.3 %
MAN DIFF?: NORMAL
MCHC RBC-ENTMCNC: 28.9 PG
MCHC RBC-ENTMCNC: 31.9 GM/DL
MCV RBC AUTO: 90.7 FL
MICROSCOPIC-UA: NORMAL
MONOCYTES # BLD AUTO: 0.49 K/UL
MONOCYTES NFR BLD AUTO: 9.3 %
NEUTROPHILS # BLD AUTO: 2.64 K/UL
NEUTROPHILS NFR BLD AUTO: 50 %
NITRITE URINE: NEGATIVE
NONHDLC SERPL-MCNC: 137 MG/DL
PH URINE: 7
PLATELET # BLD AUTO: 314 K/UL
POTASSIUM SERPL-SCNC: 4.8 MMOL/L
PROT SERPL-MCNC: 7.1 G/DL
PROTEIN URINE: NEGATIVE MG/DL
RBC # BLD: 4.94 M/UL
RBC # FLD: 13 %
RED BLOOD CELLS URINE: 0 /HPF
SODIUM SERPL-SCNC: 139 MMOL/L
SPECIFIC GRAVITY URINE: 1
T4 FREE SERPL-MCNC: 1.1 NG/DL
TRIGL SERPL-MCNC: 183 MG/DL
TSH SERPL-ACNC: 3.31 UIU/ML
UROBILINOGEN URINE: 0.2 MG/DL
VIT B12 SERPL-MCNC: 588 PG/ML
WBC # FLD AUTO: 5.28 K/UL
WHITE BLOOD CELLS URINE: 0 /HPF

## 2024-02-01 ENCOUNTER — NON-APPOINTMENT (OUTPATIENT)
Age: 64
End: 2024-02-01

## 2024-02-01 ENCOUNTER — APPOINTMENT (OUTPATIENT)
Dept: ULTRASOUND IMAGING | Facility: CLINIC | Age: 64
End: 2024-02-01
Payer: COMMERCIAL

## 2024-02-01 ENCOUNTER — OUTPATIENT (OUTPATIENT)
Dept: OUTPATIENT SERVICES | Facility: HOSPITAL | Age: 64
LOS: 1 days | End: 2024-02-01
Payer: COMMERCIAL

## 2024-02-01 DIAGNOSIS — R10.9 UNSPECIFIED ABDOMINAL PAIN: ICD-10-CM

## 2024-02-01 LAB — LPL SERPL-CCNC: 30 U/L

## 2024-02-01 PROCEDURE — 76700 US EXAM ABDOM COMPLETE: CPT

## 2024-02-01 PROCEDURE — 76700 US EXAM ABDOM COMPLETE: CPT | Mod: 26

## 2024-03-26 DIAGNOSIS — Z12.11 ENCOUNTER FOR SCREENING FOR MALIGNANT NEOPLASM OF COLON: ICD-10-CM

## 2024-04-01 ENCOUNTER — APPOINTMENT (OUTPATIENT)
Dept: INTERNAL MEDICINE | Facility: AMBULATORY MEDICAL SERVICES | Age: 64
End: 2024-04-01
Payer: COMMERCIAL

## 2024-04-01 PROCEDURE — 43239 EGD BIOPSY SINGLE/MULTIPLE: CPT

## 2024-04-01 PROCEDURE — 45385 COLONOSCOPY W/LESION REMOVAL: CPT

## 2024-04-10 RX ORDER — SODIUM SULFATE, POTASSIUM SULFATE AND MAGNESIUM SULFATE 1.6; 3.13; 17.5 G/177ML; G/177ML; G/177ML
17.5-3.13-1.6 SOLUTION ORAL
Qty: 2 | Refills: 0 | Status: DISCONTINUED | COMMUNITY
Start: 2024-03-26 | End: 2024-04-10

## 2024-05-23 DIAGNOSIS — K21.9 GASTRO-ESOPHAGEAL REFLUX DISEASE W/OUT ESOPHAGITIS: ICD-10-CM

## 2024-05-23 RX ORDER — PANTOPRAZOLE 40 MG/1
40 TABLET, DELAYED RELEASE ORAL DAILY
Qty: 90 | Refills: 3 | Status: ACTIVE | COMMUNITY
Start: 2024-05-23 | End: 1900-01-01

## 2024-06-06 ENCOUNTER — TRANSCRIPTION ENCOUNTER (OUTPATIENT)
Age: 64
End: 2024-06-06

## 2024-06-07 ENCOUNTER — TRANSCRIPTION ENCOUNTER (OUTPATIENT)
Age: 64
End: 2024-06-07

## 2024-07-18 ENCOUNTER — NON-APPOINTMENT (OUTPATIENT)
Age: 64
End: 2024-07-18

## 2024-08-26 ENCOUNTER — TRANSCRIPTION ENCOUNTER (OUTPATIENT)
Age: 64
End: 2024-08-26

## 2024-08-27 ENCOUNTER — TRANSCRIPTION ENCOUNTER (OUTPATIENT)
Age: 64
End: 2024-08-27

## 2024-09-09 ENCOUNTER — APPOINTMENT (OUTPATIENT)
Dept: ORTHOPEDIC SURGERY | Facility: CLINIC | Age: 64
End: 2024-09-09

## 2024-09-09 DIAGNOSIS — M79.642 PAIN IN LEFT HAND: ICD-10-CM

## 2024-09-09 DIAGNOSIS — M65.321 TRIGGER FINGER, RIGHT INDEX FINGER: ICD-10-CM

## 2024-09-09 DIAGNOSIS — M79.641 PAIN IN RIGHT HAND: ICD-10-CM

## 2024-09-09 DIAGNOSIS — M62.838 OTHER MUSCLE SPASM: ICD-10-CM

## 2024-09-09 PROCEDURE — 99203 OFFICE O/P NEW LOW 30 MIN: CPT | Mod: 25

## 2024-09-09 PROCEDURE — 20550 NJX 1 TENDON SHEATH/LIGAMENT: CPT | Mod: F6

## 2024-09-09 PROCEDURE — 73130 X-RAY EXAM OF HAND: CPT | Mod: LT

## 2024-09-09 NOTE — IMAGING
[de-identified] : right hand: skin intact no deformities  NVID FAROM  TTP at the IF A1 pulley   left hand: skin intact no deformities NVID FAROM No TTP  xrays 3 views of b/l hands show: no fx/dislocations

## 2024-09-09 NOTE — ASSESSMENT
[FreeTextEntry1] : The patient was advised of the diagnosis. The natural history of the pathology was explained in full to the patient in layman's terms. All questions were answered. The risks and benefits of surgical and non-surgical treatment alternatives were explained in full to the patient.  NSAIDs recommended.  Patient warned of risk of NSAID medication to stomach and GI tract, risk of increase blood pressure, cardiac risk, and risk of fluid retention.  The patient should clear taking medication with internist/PMD if any problem with heart, blood pressure, or GI system exists.  surgery, CSI, and therapy options discussed.  voltaran gel was recommended to pt   CSI#1 given to right index in office today; tolerated well   f/u 4 weeks

## 2024-09-09 NOTE — HISTORY OF PRESENT ILLNESS
[de-identified] : 9/9/24: Pt reports pain in the right hand and involuntary bending in the right index finger. Pt reports that left hand bends involuntarily. Pt shakes out hands and it helps.  pths: lymphedema trada - left lower leg; wears compression stalks

## 2024-09-28 ENCOUNTER — INPATIENT (INPATIENT)
Facility: HOSPITAL | Age: 64
LOS: 1 days | Discharge: HOME | DRG: 93 | End: 2024-09-30
Attending: NEUROMUSCULOSKELETAL MEDICINE & OMM | Admitting: NEUROMUSCULOSKELETAL MEDICINE & OMM
Payer: COMMERCIAL

## 2024-09-28 VITALS
RESPIRATION RATE: 20 BRPM | TEMPERATURE: 98 F | DIASTOLIC BLOOD PRESSURE: 104 MMHG | WEIGHT: 130.95 LBS | SYSTOLIC BLOOD PRESSURE: 166 MMHG | HEIGHT: 63 IN | HEART RATE: 79 BPM

## 2024-09-28 LAB
ALBUMIN SERPL ELPH-MCNC: 4.3 G/DL — SIGNIFICANT CHANGE UP (ref 3.3–5)
ALP SERPL-CCNC: 65 U/L — SIGNIFICANT CHANGE UP (ref 40–120)
ALT FLD-CCNC: 32 U/L — SIGNIFICANT CHANGE UP (ref 10–45)
ANION GAP SERPL CALC-SCNC: 12 MMOL/L — SIGNIFICANT CHANGE UP (ref 5–17)
APTT BLD: 31.5 SEC — SIGNIFICANT CHANGE UP (ref 24.5–35.6)
AST SERPL-CCNC: 25 U/L — SIGNIFICANT CHANGE UP (ref 10–40)
BASOPHILS # BLD AUTO: 0.03 K/UL — SIGNIFICANT CHANGE UP (ref 0–0.2)
BASOPHILS NFR BLD AUTO: 0.6 % — SIGNIFICANT CHANGE UP (ref 0–2)
BILIRUB SERPL-MCNC: 0.3 MG/DL — SIGNIFICANT CHANGE UP (ref 0.2–1.2)
BUN SERPL-MCNC: 15 MG/DL — SIGNIFICANT CHANGE UP (ref 7–23)
CALCIUM SERPL-MCNC: 9.6 MG/DL — SIGNIFICANT CHANGE UP (ref 8.4–10.5)
CHLORIDE SERPL-SCNC: 103 MMOL/L — SIGNIFICANT CHANGE UP (ref 96–108)
CO2 SERPL-SCNC: 24 MMOL/L — SIGNIFICANT CHANGE UP (ref 22–31)
CREAT SERPL-MCNC: 0.6 MG/DL — SIGNIFICANT CHANGE UP (ref 0.5–1.3)
EGFR: 100 ML/MIN/1.73M2 — SIGNIFICANT CHANGE UP
EOSINOPHIL # BLD AUTO: 0.06 K/UL — SIGNIFICANT CHANGE UP (ref 0–0.5)
EOSINOPHIL NFR BLD AUTO: 1.2 % — SIGNIFICANT CHANGE UP (ref 0–6)
GAS PNL BLDV: SIGNIFICANT CHANGE UP
GLUCOSE SERPL-MCNC: 83 MG/DL — SIGNIFICANT CHANGE UP (ref 70–99)
HCT VFR BLD CALC: 45.2 % — HIGH (ref 34.5–45)
HGB BLD-MCNC: 14.4 G/DL — SIGNIFICANT CHANGE UP (ref 11.5–15.5)
IMM GRANULOCYTES NFR BLD AUTO: 0.2 % — SIGNIFICANT CHANGE UP (ref 0–0.9)
INR BLD: 1.12 RATIO — SIGNIFICANT CHANGE UP (ref 0.85–1.16)
LYMPHOCYTES # BLD AUTO: 1.75 K/UL — SIGNIFICANT CHANGE UP (ref 1–3.3)
LYMPHOCYTES # BLD AUTO: 33.6 % — SIGNIFICANT CHANGE UP (ref 13–44)
MCHC RBC-ENTMCNC: 29 PG — SIGNIFICANT CHANGE UP (ref 27–34)
MCHC RBC-ENTMCNC: 31.9 GM/DL — LOW (ref 32–36)
MCV RBC AUTO: 90.9 FL — SIGNIFICANT CHANGE UP (ref 80–100)
MONOCYTES # BLD AUTO: 0.55 K/UL — SIGNIFICANT CHANGE UP (ref 0–0.9)
MONOCYTES NFR BLD AUTO: 10.6 % — SIGNIFICANT CHANGE UP (ref 2–14)
NEUTROPHILS # BLD AUTO: 2.81 K/UL — SIGNIFICANT CHANGE UP (ref 1.8–7.4)
NEUTROPHILS NFR BLD AUTO: 53.8 % — SIGNIFICANT CHANGE UP (ref 43–77)
NRBC # BLD: 0 /100 WBCS — SIGNIFICANT CHANGE UP (ref 0–0)
PLATELET # BLD AUTO: 283 K/UL — SIGNIFICANT CHANGE UP (ref 150–400)
POTASSIUM SERPL-MCNC: 3.7 MMOL/L — SIGNIFICANT CHANGE UP (ref 3.5–5.3)
POTASSIUM SERPL-SCNC: 3.7 MMOL/L — SIGNIFICANT CHANGE UP (ref 3.5–5.3)
PROT SERPL-MCNC: 7.2 G/DL — SIGNIFICANT CHANGE UP (ref 6–8.3)
PROTHROM AB SERPL-ACNC: 12.7 SEC — SIGNIFICANT CHANGE UP (ref 9.9–13.4)
RBC # BLD: 4.97 M/UL — SIGNIFICANT CHANGE UP (ref 3.8–5.2)
RBC # FLD: 12.6 % — SIGNIFICANT CHANGE UP (ref 10.3–14.5)
SODIUM SERPL-SCNC: 139 MMOL/L — SIGNIFICANT CHANGE UP (ref 135–145)
TROPONIN T, HIGH SENSITIVITY RESULT: <6 NG/L — SIGNIFICANT CHANGE UP (ref 0–51)
WBC # BLD: 5.21 K/UL — SIGNIFICANT CHANGE UP (ref 3.8–10.5)
WBC # FLD AUTO: 5.21 K/UL — SIGNIFICANT CHANGE UP (ref 3.8–10.5)

## 2024-09-28 PROCEDURE — 70496 CT ANGIOGRAPHY HEAD: CPT | Mod: 26,MC

## 2024-09-28 PROCEDURE — 70498 CT ANGIOGRAPHY NECK: CPT | Mod: 26,MC

## 2024-09-28 PROCEDURE — 70450 CT HEAD/BRAIN W/O DYE: CPT | Mod: 26,MC,59

## 2024-09-28 PROCEDURE — 99223 1ST HOSP IP/OBS HIGH 75: CPT

## 2024-09-28 RX ORDER — ZOLPIDEM TARTRATE 5 MG
5 TABLET ORAL AT BEDTIME
Refills: 0 | Status: DISCONTINUED | OUTPATIENT
Start: 2024-09-28 | End: 2024-09-30

## 2024-09-28 RX ADMIN — Medication 5 MILLIGRAM(S): at 22:20

## 2024-09-28 NOTE — ED PROVIDER NOTE - OBJECTIVE STATEMENT
63 y/o F pmhx lymphedema and osteopenia p/w slurred speech. Patient c/o 3 mo. of slurred speech, acutely worsening since this morning. Patient knows what she wants to say, however, is having difficulty speaking. Patient was evaluated by Dr. Gillespie in clinic this AM and sent in for further evaluation. Denies fever, chills, body aches, neck pain, nausea, vomiting, dizziness, lightheadness, difficulty ambulating.

## 2024-09-28 NOTE — ED ADULT TRIAGE NOTE - CHIEF COMPLAINT QUOTE
Pt states that for the past 3 mos she's had some slurred/slowed speech however symptoms worsening x 2 days and having b/l arm pain and trigger finger.

## 2024-09-28 NOTE — ED ADULT NURSE REASSESSMENT NOTE - NS ED NURSE REASSESS COMMENT FT1
Pt received from CHEL Goldsmith. Pt oriented to CDU & plan of care was discussed. Pt A&O x 4. Pt in CDU for MRI, tele, neuro checks q 4. Pt denies any lightheadedness, weakness, numbness, visual changes, chest pain, SOB, dizziness or palpitations. On neuro exam, A&O x 4, PERRLA, EOMI, strength equal, sensation intact, clear speech. V/S stable, pt afebrile,  IV in place, patent and free of signs of infiltration. Pt resting in bed. Safety & comfort measures maintained. Call bell in reach. Will continue to monitor. Pt received from CHEL Goldsmith. Pt oriented to CDU & plan of care was discussed. Pt A&O x 4. Pt in CDU for MRI, tele, neuro checks q 4. Pt denies any lightheadedness, weakness, numbness, visual changes, chest pain, SOB, dizziness or palpitations. Pt has slurred speech, states it got worse this morning. Pt on a cardiac monitor in NSR, HR in 60's. On neuro exam, A&O x 4, PERRLA, EOMI, strength equal, sensation intact, clear speech. V/S stable, pt afebrile, IV in place, patent and free of signs of infiltration. Pt resting in bed. Safety & comfort measures maintained. Call bell in reach. Will continue to monitor.

## 2024-09-28 NOTE — ED PROVIDER NOTE - CLINICAL SUMMARY MEDICAL DECISION MAKING FREE TEXT BOX
63 y/o F pmhx lymphedema and osteopenia p/w slurred speech. Patient c/o 3 mo. of slurred speech, acutely worsening since this morning. Patient knows what she wants to say, however, is having difficulty speaking. Patient was evaluated by Dr. Gillespie in clinic this AM and sent in for further evaluation. On arrival pt. is HD stable. On exam CN II-XII intact, 5/5 UE motor strength, 4/5 motor strength b/l LE, sensation intact to light touch. Differential includes ischemic vs. hemorrhagic stroke, c/f neurodegenerative disease, Wernickies due to alcohol history. Will order CTH, CTA head and neck w/ IV contrast, cbc/cmp/troponin/pt and ptt. Patient will require neurology c/s and admission.

## 2024-09-28 NOTE — ED CDU PROVIDER INITIAL DAY NOTE - OBJECTIVE STATEMENT
65 y/o F pmhx lymphedema and osteopenia p/w slurred speech. Patient c/o 3 mo. of slurred speech, acutely worsening since this morning. Patient knows what she wants to say, however, is having difficulty speaking. Patient was evaluated by Dr. Gillespie in clinic this AM and sent in for further evaluation. Denies fever, chills, body aches, neck pain, nausea, vomiting, dizziness, lightheadness, difficulty ambulating.

## 2024-09-28 NOTE — ED CDU PROVIDER INITIAL DAY NOTE - ATTENDING APP SHARED VISIT CONTRIBUTION OF CARE
I have personally performed a face to face medical and diagnostic evaluation of the patient. I have discussed with and reviewed the Resident's and/or ACP's and/or Medical/PA/NP student's note and agree with the History, ROS, Physical Exam and MDM unless otherwise indicated. A brief summary of my personal evaluation and impression can be found below.     see ed attg provider note

## 2024-09-28 NOTE — ED ADULT NURSE NOTE - OBJECTIVE STATEMENT
Jordan x4 came in for slurred speech and aphasia. Patient reports for the last three months she has been having difficulty formulating sentences and that her speech has been slurred. Patient met with her neurologist today and was told to come to the ED for further test and evaluation to rule out prior stroke or brain tumor. Patient has no arm or leg drift bilaterally. Hand  is strong and equal bilaterally. Patient is ambulatory without assistance at baseline. Patient presents with mild aphasia and slurred speech. PMH of osteopetrosis. Denies fever, chills, headache, blurry vision, dizziness, n/v/d, dysuria, weakness, numbness, tingling, SOB, and chest pain.  is present bedside. Call bell within reach, bed in lowest position.

## 2024-09-28 NOTE — CONSULT NOTE ADULT - ATTENDING COMMENTS
64-year-old with subacute progressive dysarthria.  Examination is notable for absence of aphasia.  She is exhibiting dysarthria, hypophonia, poor lingual movements with lingual fasciculations, bilateral diffuse hand weakness, possible left fasciculation of left abductor pollicis brevis.  Presentation is worrisome for progressive bulbar palsy, possible ALS variant.  Rule out myasthenia gravis and other etiologies.    Suggestions: MRI brain with and without contrast.  Comprehensive serologies, EMG and nerve conduction studies as outpatient.

## 2024-09-28 NOTE — ED ADULT NURSE REASSESSMENT NOTE - NS ED NURSE REASSESS COMMENT FT1
Pt received from RN Allie Cota at 1900. Pt oriented to CDU and plan of care was discussed. Pt is observed for slurred speech, here for tele, neuro checks, MRI/MRA. Pt still experiencing mild to moderate dysarthria. Neuro check performed, pt states she has decreased sensation to LLE due to lymphedema, see ED Adult Flow Sheet. A&Ox4, obeys commands, ambulatory, independent. Denies HA, dizziness, blurry vision. Respirations spontaneous and unlabored. Denies SOB, dyspnea, cough, CP, palpitations. On CM, NSR. IV site patent, no signs of infiltration noted. Denies N/V/D/C. Pt afebrile, denies chills. Pt resting in bed. Safety & comfort measures maintained. Call bell within reach.

## 2024-09-28 NOTE — ED ADULT NURSE NOTE - NSFALLUNIVINTERV_ED_ALL_ED
Bed/Stretcher in lowest position, wheels locked, appropriate side rails in place/Call bell, personal items and telephone in reach/Instruct patient to call for assistance before getting out of bed/chair/stretcher/Non-slip footwear applied when patient is off stretcher/[place] to call system/Physically safe environment - no spills, clutter or unnecessary equipment/Purposeful proactive rounding/Room/bathroom lighting operational, light cord in reach

## 2024-09-28 NOTE — ED ADULT NURSE REASSESSMENT NOTE - NSFALLUNIVINTERV_ED_ALL_ED
Bed/Stretcher in lowest position, wheels locked, appropriate side rails in place/Call bell, personal items and telephone in reach/Instruct patient to call for assistance before getting out of bed/chair/stretcher/Non-slip footwear applied when patient is off stretcher/[place] to call system/Physically safe environment - no spills, clutter or unnecessary equipment/Purposeful proactive rounding/Room/bathroom lighting operational, light cord in reach

## 2024-09-28 NOTE — CONSULT NOTE ADULT - SUBJECTIVE AND OBJECTIVE BOX
Neurology - Consult Note    -  Spectra: [phone] ([facility]), [phone] ([facility])  -    HPI: Patient LESLEY GIRALDO is a 64y (1960) woman with a PMHx significant for osteoporosis, GERD, skin cancer s/p resection, who presents w/ CC of slurred speech. Patient's illness began 3 months ago and has been gradually getting worse. Onset was also gradual. She notices her speech is both slurred and slower in wolf. No word finding difficulties. No difficulty understanding others. Over the same time, she has noticed weakness in her hands (R>L), as well as difficulty chewing and swallowing (both foods and liquids). Food has gotten suck in her throat on occasion. She denies focal numbness, hearing or vision loss, headaches, difficulty walking, or dizziness. She is independent in ADLs.  to her . 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 She denies diplopia, difficulty breathing, or ptosis. Family history positive for CVA in mother, as well as Alzheimer's. No smoking or alcohol use. Only surgeries were 2x C-sections. She has 2 sons, one with schizoaffective d/o, the other with anxiety. Home medications are eszopicline PRN and fluocinonide ointment.    Review of Systems:  INCOMPLETE   CONSTITUTIONAL: No fevers or chills  EYES AND ENT: No visual changes or no throat pain   NECK: No pain or stiffness  RESPIRATORY: No hemoptysis or shortness of breath  CARDIOVASCULAR: No chest pain or palpitations  GASTROINTESTINAL: No melena or hematochezia  GENITOURINARY: No dysuria or hematuria  NEUROLOGICAL: +As stated in HPI above  SKIN: No itching, burning, rashes, or lesions   All other review of systems is negative unless indicated above.    Allergies:  No Known Allergies      PMHx/PSHx/Family Hx: As above, otherwise see below   No pertinent past medical history        Social Hx:  No current use of tobacco, alcohol, or illicit drugs  Lives with ***    Medications:  MEDICATIONS  (STANDING):    MEDICATIONS  (PRN):      Vitals:  T(C): 36.6 (09-28-24 @ 10:50), Max: 36.7 (09-28-24 @ 10:20)  HR: 69 (09-28-24 @ 15:00) (61 - 79)  BP: 138/86 (09-28-24 @ 15:00) (138/86 - 166/104)  RR: 17 (09-28-24 @ 15:00) (17 - 20)  SpO2: 100% (09-28-24 @ 15:00) (98% - 100%)    Physical Examination: INCOMPLETE  General - NAD  Cardiovascular - Peripheral pulses palpable, no edema  Eyes - Fundoscopy with flat, sharp optic discs and no hemorrhage or exudates; Fundoscopy not well visualized; Fundoscopy not performed due to safety precautions in the setting of the COVID-19 pandemic    Neurologic Exam:  Mental status - Awake, Alert, Oriented to person, place, and time. Speech fluent, repetition and naming intact. Follows simple and complex commands. Attention/concentration, recent and remote memory (including registration and recall), and fund of knowledge intact    Cranial nerves - PERRLA, VFF, EOMI, face sensation (V1-V3) intact b/l, facial strength intact without asymmetry b/l, hearing intact b/l, palate with symmetric elevation, trapezius OR sternocleidomastiod 5/5 strength b/l, tongue midline on protrusion with full lateral movement    Motor - Normal bulk and tone throughout. No pronator drift.  Strength testing            Deltoid      Biceps      Triceps     Wrist Extension    Wrist Flexion     Interossei         R            5                 5               5                     5                              5                        5                 5  L             5                 5               5                     5                              5                        5                 5              Hip Flexion    Hip Extension    Knee Flexion    Knee Extension    Dorsiflexion    Plantar Flexion  R              5                           5                       5                           5                            5                          5  L              5                           5                        5                           5                            5                          5    Sensation - Light touch/temperature OR pain/vibration intact throughout    DTR's -             Biceps      Triceps     Brachioradialis      Patellar    Ankle    Toes/plantar response  R             2+             2+                  2+                       2+            2+                 Down  L              2+             2+                 2+                        2+           2+                 Down    Coordination - Finger to Nose intact b/l. No tremors appreciated    Gait and station - Normal casual gait. Romberg (-)    Labs:                        14.4   5.21  )-----------( 283      ( 28 Sep 2024 11:34 )             45.2     09-28    139  |  103  |  15  ----------------------------<  83  3.7   |  24  |  0.60    Ca    9.6      28 Sep 2024 11:34    TPro  7.2  /  Alb  4.3  /  TBili  0.3  /  DBili  x   /  AST  25  /  ALT  32  /  AlkPhos  65  09-28    CAPILLARY BLOOD GLUCOSE        LIVER FUNCTIONS - ( 28 Sep 2024 11:34 )  Alb: 4.3 g/dL / Pro: 7.2 g/dL / ALK PHOS: 65 U/L / ALT: 32 U/L / AST: 25 U/L / GGT: x             PT/INR - ( 28 Sep 2024 11:34 )   PT: 12.7 sec;   INR: 1.12 ratio         PTT - ( 28 Sep 2024 11:34 )  PTT:31.5 sec  CSF:                  Radiology:  CT Head No Cont:  (28 Sep 2024 14:01)     Neurology - Consult Note    -  Spectra: [phone] ([facility]), [phone] ([facility])  -    HPI: Patient LESLEY GIRALDO is a 64y (1960) woman with a PMHx significant for osteoporosis, GERD, skin cancer s/p resection, who presents w/ CC of slurred speech. Patient's illness began 3 months ago and has been gradually getting worse. Onset was also gradual. She notices her speech is both slurred and slower in wolf. No word finding difficulties. No difficulty understanding others. Over the same time, she has noticed weakness in her hands (R>L), as well as difficulty chewing and swallowing (both foods and liquids). Food has gotten suck in her throat on occasion. She denies focal numbness, hearing or vision loss, headaches, difficulty walking, or dizziness. She is independent in ADLs.  to her . 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 She denies diplopia, difficulty breathing, or ptosis. Family history positive for CVA in mother, as well as Alzheimer's. No smoking or alcohol use. Only surgeries were 2x C-sections. She has 2 sons, one with schizoaffective d/o, the other with anxiety. Home medications are eszopicline PRN and fluocinonide ointment.    Review of Systems:    CONSTITUTIONAL: No fevers or chills  EYES AND ENT: No visual changes or no throat pain; difficulty swallowing  NECK: No pain or stiffness  RESPIRATORY: No hemoptysis or shortness of breath  CARDIOVASCULAR: No chest pain or palpitations  GASTROINTESTINAL: No melena or hematochezia  GENITOURINARY: No dysuria or hematuria  NEUROLOGICAL: +As stated in HPI above  SKIN: No itching, burning, rashes, or lesions   All other review of systems is negative unless indicated above.    Allergies:  No Known Allergies      PMHx/PSHx/Family Hx: As above, otherwise see below   No pertinent past medical history        Social Hx:  No current use of tobacco, alcohol, or illicit drugs  Lives with     Medications:  MEDICATIONS  (STANDING):    MEDICATIONS  (PRN):      Vitals:  T(C): 36.6 (09-28-24 @ 10:50), Max: 36.7 (09-28-24 @ 10:20)  HR: 69 (09-28-24 @ 15:00) (61 - 79)  BP: 138/86 (09-28-24 @ 15:00) (138/86 - 166/104)  RR: 17 (09-28-24 @ 15:00) (17 - 20)  SpO2: 100% (09-28-24 @ 15:00) (98% - 100%)    Physical Examination:   General - NAD, pleasant cooperative female  Cardiovascular - Peripheral pulses palpable, no edema  Eyes - wearing corrective lenses    Neurologic Exam:  Mental status - Awake, Alert, Oriented to person, place, and time. Speech with mild-moderate dysarthria and slowed wolf (scanning quality), repetition and naming intact. Follows simple and complex commands. Attention/concentration, recent and remote memory (including registration and recall), and fund of knowledge intact    Cranial nerves - PERRLA, VFF, EOMI, face sensation (V1-V3) intact b/l, facial strength intact without asymmetry b/l, hearing intact b/l, palate with symmetric elevation, trapezius 5/5 strength b/l, tongue midline on protrusion with slowed lateral movements and notable fasciculations    Motor - Normal bulk and tone throughout. No pronator drift. 5/5 strength in b/l deltoids, biceps, triceps, hand , finger flexion/abduction. 4/5 strength in b/l thumb extension. 5/5 strength in b/l hip flexors, ankle dorsi/plantarflexion. Intermittent fasciculations noted in L forearm    Sensation - Light touch and vibration intact throughout    DTR's -             Biceps      Triceps     Brachioradialis      Patellar    Ankle    Toes/plantar response  R             2+             2+                  2+                       2+            2+                 Down  L              2+             2+                 2+                        2+           2+                 Down    Coordination - Finger to Nose intact b/l. No tremors appreciated    Gait and station - Normal casual gait. Romberg (-)    Labs:                        14.4   5.21  )-----------( 283      ( 28 Sep 2024 11:34 )             45.2     09-28    139  |  103  |  15  ----------------------------<  83  3.7   |  24  |  0.60    Ca    9.6      28 Sep 2024 11:34    TPro  7.2  /  Alb  4.3  /  TBili  0.3  /  DBili  x   /  AST  25  /  ALT  32  /  AlkPhos  65  09-28    CAPILLARY BLOOD GLUCOSE        LIVER FUNCTIONS - ( 28 Sep 2024 11:34 )  Alb: 4.3 g/dL / Pro: 7.2 g/dL / ALK PHOS: 65 U/L / ALT: 32 U/L / AST: 25 U/L / GGT: x             PT/INR - ( 28 Sep 2024 11:34 )   PT: 12.7 sec;   INR: 1.12 ratio         PTT - ( 28 Sep 2024 11:34 )  PTT:31.5 sec  CSF:                  Radiology:  CT Head No Cont:  (28 Sep 2024 14:01)  CT HEAD:  No acute intracranial hemorrhage, mass effect, or midline shift.   Specifically, no large arterial distribution acute infarct.    CTA NECK:  No evidence of significant stenosis or occlusion.    CTA HEAD:  No large vessel occlusion, significant stenosis or vascular abnormality   identified.

## 2024-09-28 NOTE — ED PROVIDER NOTE - PHYSICAL EXAMINATION
Physical Exam:  Gen: NAD, AOx3, non-toxic appearing, able to ambulate without assistance  Head: NCAT  HEENT: EOMI, PEERLA, normal conjunctiva, tongue midline, oral mucosa moist  Lung: CTAB, no respiratory distress, no wheezes/rhonchi/rales B/L, speaking in full sentences  CV: RRR, no murmurs, rubs or gallops  Abd: soft, NT, ND, no guarding, no rigidity, no rebound tenderness, no CVA tenderness   MSK: no visible deformities, ROM normal in UE/LE, no back pain  Neuro: CN II-XII intact, 5/5 motor strength b/l UE, 4/5 motor strength b/l LE, sensation intact to light touch b/l UE and LE, slurred speech   Skin: Warm, well perfused, no rash, no leg swelling  Psych: normal affect, calm

## 2024-09-28 NOTE — CONSULT NOTE ADULT - ASSESSMENT
64y F with Hx GERD, osteoporosis/osteopenia, skin cancer s/p resection, who presents w/ CC of slurred speech    Impression: 3 months of progressively worsening dysarthria, slowed wolf, difficulty w/ tongue movements, difficulty chewing and swallowing. Exam notable for dysarthria w/ scanning speech, tongue and arm fasciculations, suggestive of progressive bulbar palsy, possibly ALS. Less likely myasthenia gravis vs infarct    Recommendations:  [] admit to CDU for MR brain w/ w/o contrast  [] send MG labs: anti-MUSK, Acetycholine receptor, Ach modulating, and Ach blocking  [] SLP evaluation for language and swallow studies  [] outpatient EMG - can follow-up with Dr Gillespie ([phone]) vs Dr Jean ([phone])  [] neuro checks q8h  [] neurology will continue to follow    Case discussed and staffed at bedside w/ attending Dr Jean

## 2024-09-28 NOTE — ED CDU PROVIDER INITIAL DAY NOTE - PHYSICAL EXAMINATION
Physical Exam:  Gen: NAD, AOx3, non-toxic appearing, able to ambulate without assistance +slurred muted speech slow   Head: NCAT  HEENT: EOMI, PEERLA, normal conjunctiva, tongue midline, oral mucosa moist  Lung: CTAB, no respiratory distress, no wheezes/rhonchi/rales B/L, speaking in full sentences  CV: RRR, no murmurs, rubs or gallops  Abd: soft, NT, ND, no guarding, no rigidity, no rebound tenderness, no CVA tenderness   MSK: no visible deformities, ROM normal in UE/LE, no back pain  Neuro: CN II-XII intact, 5/5 motor strength b/l UE, 4/5 motor strength b/l LE, sensation intact to light touch b/l UE and LE, slurred speech   Skin: Warm, well perfused, no rash, no leg swelling  Psych: normal affect, calm

## 2024-09-29 DIAGNOSIS — R47.1 DYSARTHRIA AND ANARTHRIA: ICD-10-CM

## 2024-09-29 PROCEDURE — 99233 SBSQ HOSP IP/OBS HIGH 50: CPT

## 2024-09-29 PROCEDURE — 70553 MRI BRAIN STEM W/O & W/DYE: CPT | Mod: 26,MC

## 2024-09-29 RX ORDER — ACETAMINOPHEN 325 MG
975 TABLET ORAL ONCE
Refills: 0 | Status: COMPLETED | OUTPATIENT
Start: 2024-09-29 | End: 2024-09-29

## 2024-09-29 RX ORDER — KETOROLAC TROMETHAMINE 10 MG/1
15 TABLET, FILM COATED ORAL ONCE
Refills: 0 | Status: DISCONTINUED | OUTPATIENT
Start: 2024-09-29 | End: 2024-09-29

## 2024-09-29 RX ORDER — ENOXAPARIN SODIUM 150 MG/ML
40 INJECTION SUBCUTANEOUS EVERY 24 HOURS
Refills: 0 | Status: DISCONTINUED | OUTPATIENT
Start: 2024-09-29 | End: 2024-09-30

## 2024-09-29 RX ADMIN — KETOROLAC TROMETHAMINE 15 MILLIGRAM(S): 10 TABLET, FILM COATED ORAL at 14:25

## 2024-09-29 RX ADMIN — Medication 975 MILLIGRAM(S): at 12:45

## 2024-09-29 RX ADMIN — Medication 5 MILLIGRAM(S): at 21:48

## 2024-09-29 NOTE — ED CDU PROVIDER SUBSEQUENT DAY NOTE - HISTORY
Patient reassessed, resting comfortably without any new or evolving complaints.  VSS.  No events on telemetry.  Plan for MRI today, neuro following.

## 2024-09-29 NOTE — SWALLOW BEDSIDE ASSESSMENT ADULT - SLP PATIENT PROFILE REVIEW
yes
You can access the FollowMyHealth Patient Portal offered by [facility] by registering at the following website: [email or web address]. By joining [facility]’s FollowMyHealth portal, you will also be able to view your health information using other applications (apps) compatible with our system.

## 2024-09-29 NOTE — ED ADULT NURSE REASSESSMENT NOTE - NS ED NURSE REASSESS COMMENT FT1
07.00 Received the Pt from  CHEL Gonzalez  Pt is Observed for Slurred Speech for MRI. Received the Pt A&OX 4  Pt obeys commands Rachel N/V/D fever chills cp SOB headache dizziness .Comfort care & safety measures continued  IV site looks clean & dry no signs of infiltration noted pt denies  pain IV site .Pt is oriented to the unit Plan of care explained . Pt is advised to call for help  call bell with in the reach pt verbalized the understanding .  pending CDU  MD russell . GCS 15/15 A&OX 4 PERRLA  size 3 Strong upper & lower extremities steady gait  Pt is ambulatory & independent   No facial droop  No Hand Leg drop denies numbness tingling pt has Slurred Speech speaks in clear sentences  Plan of care ongoing  08.30 Pt got evaluated by CDU MD Dodie musa . Pending MRI result & Dispo  09.30 Speech eval on progress

## 2024-09-29 NOTE — ED CDU PROVIDER DISPOSITION NOTE - ATTENDING APP SHARED VISIT CONTRIBUTION OF CARE
Yi: 65 y/o F pmhx lymphedema and osteopenia p/w slurred speech. Patient c/o 3 mo. of slurred speech, acutely worsening since this morning. Patient knows what she wants to say, however, is having difficulty speaking. Patient was evaluated by Dr. Gillespie in clinic this AM and sent in for further evaluation. Denies fever, chills, body aches, neck pain, nausea, vomiting, dizziness, lightheadness, difficulty ambulating.  In CDU, patient symptoms persisted, speech eval recommending modified barium swallow. neuro to admit patient for further workup.    I have seen and evaluated the patient face to face, endorse the HPI, PE, as edited and/or reviewed by me as needed and have indicated my contribution to care in the MDM section.

## 2024-09-29 NOTE — ED ADULT NURSE REASSESSMENT NOTE - NSFALLHARMRISKINTERV_ED_ALL_ED

## 2024-09-29 NOTE — SWALLOW BEDSIDE ASSESSMENT ADULT - SLP GENERAL OBSERVATIONS
Pt encountered bedside in CDU with  present. AA&Ox4, follows simple commands and answers simple yes/no questions. + Moderate dysarthria marked by low vocal volume/intensity, slow rate, equal stress provided to each syllable. +Lingual fasciculations at rest. Pt states these symptoms began 3 months ago, however, progressively declined over the last 3 days. She reports 2 episodes of choking on solids and occasional cough with water. Pt and  agreeable to modifying to a soft texture diet with an MBS planned for tomorrow, 9/30 to further assess pharyngeal phase.

## 2024-09-29 NOTE — ED CDU PROVIDER SUBSEQUENT DAY NOTE - PHYSICAL EXAMINATION
GEN: Pt non-toxic in NAD, alert.  PSYCH: Affect and mood appropriate.  EYES: Sclera white w/o injection, EOMI, PERRLA.   ENT: Neck supple. Airway patent.  RESP: CTA b/l.  CARDIAC: RRR, S1, S2, no M/G/R.  ABD: Soft, NT. No CVAT b/l.  MSK: MOREAU spontaneously.  NEURO: Nonfocal. Comprehensible but slowed speech.   VASC: Strong distal pulses.  SKIN: No rashes or lesions.

## 2024-09-29 NOTE — H&P ADULT - NSHPLABSRESULTS_GEN_ALL_CORE
LABS:  cret                        14.4   5.21  )-----------( 283      ( 28 Sep 2024 11:34 )             45.2     09-28    139  |  103  |  15  ----------------------------<  83  3.7   |  24  |  0.60    Ca    9.6      28 Sep 2024 11:34    TPro  7.2  /  Alb  4.3  /  TBili  0.3  /  DBili  x   /  AST  25  /  ALT  32  /  AlkPhos  65  09-28    PT/INR - ( 28 Sep 2024 11:34 )   PT: 12.7 sec;   INR: 1.12 ratio         PTT - ( 28 Sep 2024 11:34 )  PTT:31.5 sec    < from: MR Head w/wo IV Cont (09.29.24 @ 09:48) >    *** ADDENDUM # 1 ***    Upon additional review of the imaging, there is subtle T2/FLAIR linear   hyperintensity in the corticospinal tracts bilaterally extending towards   the precentral gyri, which is nonspecific, although may be seen with   motor neuron diseases such as amyotrophic lateral sclerosis, primary   lateral sclerosis, or alternatively, a variety of metabolic disease. This   is also appreciated on T2 coronal series 17, image 19. Correlate with   patient history and clinical examination findings.    --- End of Report ---    *** END OF ADDENDUM # 1 ***      PROCEDURE DATE:  09/29/2024          INTERPRETATION:  CLINICAL INDICATION: Speech changes, concern for stroke    TECHNIQUE: Multi-planar, multi-sequence magnetic resonance imaging of the   brain was performed with and without intravenous contrast.    CONTRAST: Post-contrast MR images were obtained following infusion of 6   mL of Gadavist, 1.5 mL were discarded    COMPARISON: No prior studies are available for comparison    FINDINGS: There is no acute infarct. There is no evidence of mass or   intracranial hemorrhage. There is no abnormal enhancement within the   brain. Ventricles and sulci are normal in size and configuration for the   patient's stated age. There are few scattered tiny periventricular white   matter T2/FLAIR hyperintensities, nonspecific, although likely due to   chronic microangiopathy. A punctate focus of SWI hypointensity in the   right posterior medial temporal lobe is most compatible with an old   microhemorrhage. No midline shift or other significant mass effect is   noted. Gray-white differentiation is maintained throughout. Normal flow   voids are maintained in the dominant arterial and venous structures.    The visualized paranasal sinuses and tympanomastoid spaces are clear.   Orbits and orbital contents are unremarkable.    IMPRESSION: No acute intracranial abnormality.    --- End of Report ---    ***Please see the addendum at the top of this report. It may contain   additional important information or changes.****    < end of copied text >

## 2024-09-29 NOTE — H&P ADULT - ASSESSMENT
64y F with Hx GERD, osteoporosis/osteopenia, skin cancer s/p resection, who presents w/ CC of worsening slurred speech for 3 months. She also notes bilateral hand weakness (R>L) as well as difficulty chewing and swallowing both solids and liquids. Denies diplopia, shortness of breath, or ptosis. No home medications.  MRI brain negative for infarct, demonstrated T2 hyperintensities in corticospinal tract.    Impression: 3 months of progressively worsening dysarthria, slowed wolf, difficulty w/ tongue movements, difficulty chewing and swallowing. Exam notable for dysarthria w/ scanning speech, tongue and arm fasciculations, suggestive of progressive bulbar palsy, possibly ALS. Less likely myasthenia gravis vs infarct    Plan:  [] Admit to general neurology service under Dr. Jean  [] MG labs: anti-MUSK, Acetycholine receptor, Ach modulating, and Ach blocking  [] VIKKI, ESR, CRP, CCP IgG, Lyme, SPEP/IPEP, ACE  [] Current diet recommendations per SLP: 1) Easy to chew with thin liquids 2) Crush medications when feasible, or float in applesauce  [] Pending modified barium swallow scheduled for 9/30  [] Aspiration precautions  [] DVT prophylaxis with Lovenox  [] neuro checks and vital signs q8h  [] Will follow with Dr. Jean as outpatient for EMG/NCS and genetic testing    Seen and discussed with attending Dr. Jean

## 2024-09-29 NOTE — ED ADULT NURSE REASSESSMENT NOTE - NS ED NURSE REASSESS COMMENT FT1
Pt received from RN Allie Cota at 1900. Pt oriented to CDU and plan of care was discussed. Pt is admitted to neuro for dysarthria, waiting for bed assignment. Pt still experiencing mild to moderate dysarthria. Neuro check performed, pt states she has decreased sensation to LLE due to lymphedema, see ED Adult Flow Sheet. A&Ox4, obeys commands, ambulatory, independent. Denies HA, dizziness, blurry vision. Respirations spontaneous and unlabored. Denies SOB, dyspnea, cough, CP, palpitations. IV site patent, no signs of infiltration noted. Denies N/V/D/C. Pt afebrile, denies chills. Pt resting in bed. Safety & comfort measures maintained. Call bell within reach.

## 2024-09-29 NOTE — ED CDU PROVIDER SUBSEQUENT DAY NOTE - CLINICAL SUMMARY MEDICAL DECISION MAKING FREE TEXT BOX
Kassidy: pt with new speech disturbance, still pending neuro final recs, speech consult recommending formal barium study, will need admission for further work up. myasthenia gravis v subacute CVA, MRI obtained, awaiting results.

## 2024-09-29 NOTE — SWALLOW BEDSIDE ASSESSMENT ADULT - SLP PERTINENT HISTORY OF CURRENT PROBLEM
64y (1960) woman with a PMHx significant for osteoporosis, GERD, skin cancer s/p resection, who presents w/ CC of slurred speech. Patient's illness began 3 months ago and has been gradually getting worse. Onset was also gradual. She notices her speech is both slurred and slower in wolf. No word finding difficulties. No difficulty understanding others. Over the same time, she has noticed weakness in her hands (R>L), as well as difficulty chewing and swallowing (both foods and liquids). Food has gotten suck in her throat on occasion. She denies focal numbness, hearing or vision loss, headaches, difficulty walking, or dizziness. She is independent in ADLs.  to her . 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 She has 2 sons, one with schizoaffective d/o, the other with anxiety.

## 2024-09-29 NOTE — SWALLOW BEDSIDE ASSESSMENT ADULT - ADDITIONAL RECOMMENDATIONS
Monitor for s/s aspiration/laryngeal penetration. If noted:  D/C p.o. intake, provide non-oral nutrition/hydration/meds, and contact this service @ [phone]  Maintain good oral hygiene  LTG: Pt will tolerate the least restrictive diet without overt signs or symptoms of penetration or aspiration Monitor for s/s aspiration/laryngeal penetration. If noted:  D/C p.o. intake, provide non-oral nutrition/hydration/meds, and contact this service @ [phone]  Maintain good oral hygiene  LTG: Pt will tolerate the least restrictive diet without overt signs or symptoms of penetration or aspiration    Suggest formal speech-language evaluation once pt admitted.

## 2024-09-29 NOTE — ED CDU PROVIDER DISPOSITION NOTE - CLINICAL COURSE
63 y/o F pmhx lymphedema and osteopenia p/w slurred speech. Patient c/o 3 mo. of slurred speech, acutely worsening since this morning. Patient knows what she wants to say, however, is having difficulty speaking. Patient was evaluated by Dr. Gillespie in clinic this AM and sent in for further evaluation. Denies fever, chills, body aches, neck pain, nausea, vomiting, dizziness, lightheadness, difficulty ambulating.  In CDU, 63 y/o F pmhx lymphedema and osteopenia p/w slurred speech. Patient c/o 3 mo. of slurred speech, acutely worsening since this morning. Patient knows what she wants to say, however, is having difficulty speaking. Patient was evaluated by Dr. Gillespie in clinic this AM and sent in for further evaluation. Denies fever, chills, body aches, neck pain, nausea, vomiting, dizziness, lightheadness, difficulty ambulating.  In CDU, patient symptoms persisted, speech eval recommending modified barium swallow. neuro to admit patient for further workup

## 2024-09-29 NOTE — SWALLOW BEDSIDE ASSESSMENT ADULT - SWALLOW EVAL: DIAGNOSIS
64 y.o. female presents with 3 months of progressively worsening speech with episodes of difficulty chewing and swallowing. Pt now presents with clinical signs of an oropharyngeal dysphagia. Swallow sequence is marked by mildly prolonged mastication of hard solids with delayed oral transit. Suspected pharyngeal dysphagia marked by multiple swallows per bolus (average 2) with c/o slight globus sensation with scrambled eggs, reportedly resolved with a liquid wash. No overt signs or symptoms of penetration or aspiration across trials. Of note, pt states she had occasional episodes of coughing with liquids, however, not observed on today's exam. Suggest MBS to further assess pharyngeal phase of swallow.

## 2024-09-29 NOTE — ED CDU PROVIDER DISPOSITION NOTE - NSFOLLOWUPINSTRUCTIONS_ED_ALL_ED_FT
Follow-up with your primary care provider within 1 to 3 days.    Follow-up with the neurologist you recently established care with or with [facility] neurology within 1 week.    Neurology  [address] [place] [id number]  Phone: [phone]  Fax: [phone]    Continue all medications as prescribed.    Return to the ER with any new, worsening or concerning symptoms.

## 2024-09-29 NOTE — ED ADULT NURSE REASSESSMENT NOTE - NS ED NURSE REASSESS COMMENT FT1
Pt refusing vitals until 0500 on 9/30/24, states "I want to sleep without any interruptions". Pt refusing neuro checks and vitals until 0500 on 9/30/24, states "I want to sleep without any interruptions". Pt refusing neuro checks and vitals until 0500 on 9/30/24, states "I want to sleep without any interruptions". Inpatient RN made aware.

## 2024-09-29 NOTE — SWALLOW BEDSIDE ASSESSMENT ADULT - PHARYNGEAL PHASE
Within functional limits c/o globus sensation x1; resolved with a liquid wash/Delayed pharyngeal swallow/Multiple swallows Delayed pharyngeal swallow/Multiple swallows

## 2024-09-29 NOTE — H&P ADULT - HISTORY OF PRESENT ILLNESS
Patient LESLEY GIRALDO is a 64y (1960) woman with a PMHx significant for osteoporosis, GERD, skin cancer s/p resection, who presents w/ CC of slurred speech. Patient's illness began 3 months ago and has been gradually getting worse. Onset was also gradual. She notices her speech is both slurred and slower in wolf. No word finding difficulties. No difficulty understanding others. Over the same time, she has noticed weakness in her hands (R>L), as well as difficulty chewing and swallowing (both foods and liquids). Food has gotten suck in her throat on occasion. She denies focal numbness, hearing or vision loss, headaches, difficulty walking, or dizziness. She is independent in ADLs.  to her . 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 She denies diplopia, difficulty breathing, or ptosis. Family history positive for CVA in mother, as well as Alzheimer's. No smoking or alcohol use. Only surgeries were 2x C-sections. She has 2 sons, one with schizoaffective d/o, the other with anxiety. Home medications are eszopicline PRN and fluocinonide ointment.

## 2024-09-29 NOTE — H&P ADULT - ATTENDING COMMENTS
I have examined the pt at bedside.  The risks and the benefits of the proposed diagnostic/therapeutic approach were thoroughly discussed.   I agree with the above plan and have modified it where necessary.    64y F with Hx GERD, osteoporosis/osteopenia, skin cancer s/p resection, who presents w/ CC of worsening slurred speech for 3 months. She also notes bilateral hand weakness (R>L) as well as difficulty chewing and swallowing both solids and liquids. Denies diplopia, shortness of breath, or ptosis. No home medications.  Per , behavioral changes in the last few mons, including mild paranoia.    Exam shows  -mild tangential speech/reasoning, with some lack of inhibition  -a few fasciculations on tongue, IO muscles and calves  -rest of exam benign.    MRI brain is negative for infarct, demonstrated T2 hyperintensity adjacent to precentral gyri, a sign at times seen in MND.    Impression:  -possible MND  -given reported and noted behavioral changes, FTD-MND is an option.    Plan:   As above, with priority to obtain NCV/EMG as outpt.

## 2024-09-29 NOTE — SWALLOW BEDSIDE ASSESSMENT ADULT - COMMENTS
Neurologic Exam:  Mental status - Awake, Alert, Oriented to person, place, and time. Speech with mild-moderate dysarthria and slowed wolf (scanning quality), repetition and naming intact. Follows simple and complex commands. Attention/concentration, recent and remote memory (including registration and recall), and fund of knowledge intact    Cranial nerves - PERRLA, VFF, EOMI, face sensation (V1-V3) intact b/l, facial strength intact without asymmetry b/l, hearing intact b/l, palate with symmetric elevation, trapezius 5/5 strength b/l, tongue midline on protrusion with slowed lateral movements and notable fasciculations.  Impression: 3 months of progressively worsening dysarthria, slowed wolf, difficulty w/ tongue movements, difficulty chewing and swallowing. Exam notable for dysarthria w/ scanning speech, tongue and arm fasciculations, suggestive of progressive bulbar palsy, possibly ALS. Less likely myasthenia gravis vs infarct

## 2024-09-29 NOTE — H&P ADULT - NSHPPHYSICALEXAM_GEN_ALL_CORE
Physical Examination:   General - NAD, pleasant cooperative female  Cardiovascular - Peripheral pulses palpable, no edema  Eyes - wearing corrective lenses    Neurologic Exam:  Mental status - Awake, Alert, Oriented to person, place, and time. Speech with mild-moderate dysarthria and slowed wolf (scanning quality), repetition and naming intact. Follows simple and complex commands. Attention/concentration, recent and remote memory (including registration and recall), and fund of knowledge intact    Cranial nerves - PERRLA, VFF, EOMI, face sensation (V1-V3) intact b/l, facial strength intact without asymmetry b/l, hearing intact b/l, palate with symmetric elevation, trapezius 5/5 strength b/l, tongue midline on protrusion with slowed lateral movements and notable fasciculations    Motor - Normal bulk and tone throughout. No pronator drift. 5/5 strength in b/l deltoids, biceps, triceps, hand , finger flexion/abduction. 4/5 strength in b/l thumb extension. 5/5 strength in b/l hip flexors, ankle dorsi/plantarflexion. Intermittent fasciculations noted in L forearm    Sensation - Light touch and vibration intact throughout    DTR's -             Biceps      Triceps     Brachioradialis      Patellar    Ankle    Toes/plantar response  R             2+             2+                  2+                       2+            2+                 Down  L              2+             2+                 2+                        2+           2+                 Down    Coordination - Finger to Nose intact b/l. No tremors appreciated    Gait and station - Normal casual gait. Romberg (-)

## 2024-09-30 ENCOUNTER — TRANSCRIPTION ENCOUNTER (OUTPATIENT)
Age: 64
End: 2024-09-30

## 2024-09-30 VITALS
HEART RATE: 77 BPM | TEMPERATURE: 98 F | OXYGEN SATURATION: 98 % | SYSTOLIC BLOOD PRESSURE: 134 MMHG | DIASTOLIC BLOOD PRESSURE: 83 MMHG | RESPIRATION RATE: 18 BRPM

## 2024-09-30 LAB
ALBUMIN SERPL ELPH-MCNC: 3.9 G/DL — SIGNIFICANT CHANGE UP (ref 3.3–5)
ALP SERPL-CCNC: 64 U/L — SIGNIFICANT CHANGE UP (ref 40–120)
ALT FLD-CCNC: 50 U/L — HIGH (ref 10–45)
ANION GAP SERPL CALC-SCNC: 10 MMOL/L — SIGNIFICANT CHANGE UP (ref 5–17)
AST SERPL-CCNC: 36 U/L — SIGNIFICANT CHANGE UP (ref 10–40)
BILIRUB SERPL-MCNC: 0.4 MG/DL — SIGNIFICANT CHANGE UP (ref 0.2–1.2)
BUN SERPL-MCNC: 10 MG/DL — SIGNIFICANT CHANGE UP (ref 7–23)
CALCIUM SERPL-MCNC: 9.2 MG/DL — SIGNIFICANT CHANGE UP (ref 8.4–10.5)
CCP IGG SERPL-ACNC: <8 U/ML — SIGNIFICANT CHANGE UP
CHLORIDE SERPL-SCNC: 105 MMOL/L — SIGNIFICANT CHANGE UP (ref 96–108)
CO2 SERPL-SCNC: 24 MMOL/L — SIGNIFICANT CHANGE UP (ref 22–31)
CREAT SERPL-MCNC: 0.55 MG/DL — SIGNIFICANT CHANGE UP (ref 0.5–1.3)
CRP SERPL-MCNC: <3 MG/L — SIGNIFICANT CHANGE UP (ref 0–4)
EGFR: 102 ML/MIN/1.73M2 — SIGNIFICANT CHANGE UP
ERYTHROCYTE [SEDIMENTATION RATE] IN BLOOD: 11 MM/HR — SIGNIFICANT CHANGE UP (ref 0–20)
GLUCOSE SERPL-MCNC: 103 MG/DL — HIGH (ref 70–99)
HCT VFR BLD CALC: 42.3 % — SIGNIFICANT CHANGE UP (ref 34.5–45)
HGB BLD-MCNC: 14.1 G/DL — SIGNIFICANT CHANGE UP (ref 11.5–15.5)
MAGNESIUM SERPL-MCNC: 2.1 MG/DL — SIGNIFICANT CHANGE UP (ref 1.6–2.6)
MCHC RBC-ENTMCNC: 29.6 PG — SIGNIFICANT CHANGE UP (ref 27–34)
MCHC RBC-ENTMCNC: 33.3 GM/DL — SIGNIFICANT CHANGE UP (ref 32–36)
MCV RBC AUTO: 88.7 FL — SIGNIFICANT CHANGE UP (ref 80–100)
NRBC # BLD: 0 /100 WBCS — SIGNIFICANT CHANGE UP (ref 0–0)
PHOSPHATE SERPL-MCNC: 3.7 MG/DL — SIGNIFICANT CHANGE UP (ref 2.5–4.5)
PLATELET # BLD AUTO: 256 K/UL — SIGNIFICANT CHANGE UP (ref 150–400)
POTASSIUM SERPL-MCNC: 4 MMOL/L — SIGNIFICANT CHANGE UP (ref 3.5–5.3)
POTASSIUM SERPL-SCNC: 4 MMOL/L — SIGNIFICANT CHANGE UP (ref 3.5–5.3)
PROT SERPL-MCNC: 6.2 G/DL — SIGNIFICANT CHANGE UP (ref 6–8.3)
PROT SERPL-MCNC: 6.4 G/DL — SIGNIFICANT CHANGE UP (ref 6–8.3)
RBC # BLD: 4.77 M/UL — SIGNIFICANT CHANGE UP (ref 3.8–5.2)
RBC # FLD: 12.3 % — SIGNIFICANT CHANGE UP (ref 10.3–14.5)
RF+CCP IGG SER-IMP: NEGATIVE — SIGNIFICANT CHANGE UP
SODIUM SERPL-SCNC: 139 MMOL/L — SIGNIFICANT CHANGE UP (ref 135–145)
WBC # BLD: 6.15 K/UL — SIGNIFICANT CHANGE UP (ref 3.8–10.5)
WBC # FLD AUTO: 6.15 K/UL — SIGNIFICANT CHANGE UP (ref 3.8–10.5)

## 2024-09-30 PROCEDURE — 70496 CT ANGIOGRAPHY HEAD: CPT | Mod: MC

## 2024-09-30 PROCEDURE — G0378: CPT

## 2024-09-30 PROCEDURE — 85018 HEMOGLOBIN: CPT

## 2024-09-30 PROCEDURE — 80053 COMPREHEN METABOLIC PANEL: CPT

## 2024-09-30 PROCEDURE — 84155 ASSAY OF PROTEIN SERUM: CPT

## 2024-09-30 PROCEDURE — 92611 MOTION FLUOROSCOPY/SWALLOW: CPT

## 2024-09-30 PROCEDURE — 70553 MRI BRAIN STEM W/O & W/DYE: CPT | Mod: MC

## 2024-09-30 PROCEDURE — 70498 CT ANGIOGRAPHY NECK: CPT | Mod: MC

## 2024-09-30 PROCEDURE — 86042 ACETYLCHOLN RCPTR BLCKG ANTB: CPT

## 2024-09-30 PROCEDURE — 86043 ACETYLCHOLN RCPTR MODLG ANTB: CPT

## 2024-09-30 PROCEDURE — 82164 ANGIOTENSIN I ENZYME TEST: CPT

## 2024-09-30 PROCEDURE — 83735 ASSAY OF MAGNESIUM: CPT

## 2024-09-30 PROCEDURE — 84295 ASSAY OF SERUM SODIUM: CPT

## 2024-09-30 PROCEDURE — 84132 ASSAY OF SERUM POTASSIUM: CPT

## 2024-09-30 PROCEDURE — 85652 RBC SED RATE AUTOMATED: CPT

## 2024-09-30 PROCEDURE — 85014 HEMATOCRIT: CPT

## 2024-09-30 PROCEDURE — 97166 OT EVAL MOD COMPLEX 45 MIN: CPT

## 2024-09-30 PROCEDURE — 99223 1ST HOSP IP/OBS HIGH 75: CPT

## 2024-09-30 PROCEDURE — 84100 ASSAY OF PHOSPHORUS: CPT

## 2024-09-30 PROCEDURE — 85027 COMPLETE CBC AUTOMATED: CPT

## 2024-09-30 PROCEDURE — 92610 EVALUATE SWALLOWING FUNCTION: CPT

## 2024-09-30 PROCEDURE — 86041 ACETYLCHOLN RCPTR BNDNG ANTB: CPT

## 2024-09-30 PROCEDURE — 85610 PROTHROMBIN TIME: CPT

## 2024-09-30 PROCEDURE — 86140 C-REACTIVE PROTEIN: CPT

## 2024-09-30 PROCEDURE — 82947 ASSAY GLUCOSE BLOOD QUANT: CPT

## 2024-09-30 PROCEDURE — 36415 COLL VENOUS BLD VENIPUNCTURE: CPT

## 2024-09-30 PROCEDURE — 99285 EMERGENCY DEPT VISIT HI MDM: CPT | Mod: 25

## 2024-09-30 PROCEDURE — A9585: CPT

## 2024-09-30 PROCEDURE — 84484 ASSAY OF TROPONIN QUANT: CPT

## 2024-09-30 PROCEDURE — 70450 CT HEAD/BRAIN W/O DYE: CPT | Mod: MC

## 2024-09-30 PROCEDURE — 82803 BLOOD GASES ANY COMBINATION: CPT

## 2024-09-30 PROCEDURE — 83605 ASSAY OF LACTIC ACID: CPT

## 2024-09-30 PROCEDURE — 82435 ASSAY OF BLOOD CHLORIDE: CPT

## 2024-09-30 PROCEDURE — 82330 ASSAY OF CALCIUM: CPT

## 2024-09-30 PROCEDURE — 86038 ANTINUCLEAR ANTIBODIES: CPT

## 2024-09-30 PROCEDURE — 74230 X-RAY XM SWLNG FUNCJ C+: CPT | Mod: 26

## 2024-09-30 PROCEDURE — 85025 COMPLETE CBC W/AUTO DIFF WBC: CPT

## 2024-09-30 PROCEDURE — 84165 PROTEIN E-PHORESIS SERUM: CPT

## 2024-09-30 PROCEDURE — 93005 ELECTROCARDIOGRAM TRACING: CPT

## 2024-09-30 PROCEDURE — 85730 THROMBOPLASTIN TIME PARTIAL: CPT

## 2024-09-30 PROCEDURE — 86200 CCP ANTIBODY: CPT

## 2024-09-30 PROCEDURE — 86618 LYME DISEASE ANTIBODY: CPT

## 2024-09-30 PROCEDURE — 86366 MUSCLE-SPECIFIC KINASE ANTB: CPT

## 2024-09-30 PROCEDURE — 74230 X-RAY XM SWLNG FUNCJ C+: CPT

## 2024-09-30 RX ORDER — ANTACID TABLETS 500 MG/1
2 TABLET, CHEWABLE ORAL EVERY 6 HOURS
Refills: 0 | Status: DISCONTINUED | OUTPATIENT
Start: 2024-09-30 | End: 2024-09-30

## 2024-09-30 RX ADMIN — ANTACID TABLETS 2 TABLET(S): 500 TABLET, CHEWABLE ORAL at 18:00

## 2024-09-30 NOTE — SWALLOW VFSS/MBS ASSESSMENT ADULT - DIAGNOSTIC IMPRESSIONS
Pt presents with mild pharyngeal swallow deficits/ weakness, though overall swallow profile is functional for a regular texture diet. There is reduced BOT retraction and reduced hyolaryngeal excursion. Instance of laryngeal penetration with thin liquids; material is shallow in the laryngeal vestibule & fully retrieved. Airway protection otherwise fully intact during exam; no aspiration captured. No significant pharyngeal residue.

## 2024-09-30 NOTE — DISCHARGE NOTE PROVIDER - NSDCCPCAREPLAN_GEN_ALL_CORE_FT
PRINCIPAL DISCHARGE DIAGNOSIS  Diagnosis: Dysarthria  Assessment and Plan of Treatment: You came to the hospital because of worsening slurred speech, difficulty swallowing/chewing, and fasiculations. There is concern that these could be caused by conditions such as ALS or myasthenia gravis.   We obtained an MRI Brain, which showed subtle findings that could be consistent with conditions like ALS. An MBS was performed, leading to a diet recommendation of X.     PRINCIPAL DISCHARGE DIAGNOSIS  Diagnosis: Dysarthria  Assessment and Plan of Treatment: You came to the hospital because of worsening slurred speech, difficulty swallowing/chewing, and fasiculations. There is concern that these could be caused by conditions such as ALS or myasthenia gravis (although official diagnosis of these conditions will require further testing and follow up). After discharge be sure to follow up outpatient with neuromuscular specialist.  We obtained an MRI Brain, which showed subtle findings that could be consistent with conditions like ALS. You were also evaluated by speech and swallow, and received an MBS study - no changes to your diet are recommended at this time, but you should follow up outpatient with speech and swallow.     PRINCIPAL DISCHARGE DIAGNOSIS  Diagnosis: Dysarthria  Assessment and Plan of Treatment: You came to the hospital because of worsening slurred speech, difficulty swallowing/chewing, and fasiculations. There is concern that these could be caused by conditions such as ALS or myasthenia gravis (although official diagnosis of these conditions will require further testing and follow up). After discharge be sure to follow up outpatient with neurology for further testing, including EMG/NCS.  We obtained an MRI Brain, which showed subtle findings that could be consistent with conditions like ALS. You were also evaluated by speech and swallow, and received an MBS study - no changes to your diet are recommended at this time, but you should follow up outpatient with speech and swallow.

## 2024-09-30 NOTE — SWALLOW VFSS/MBS ASSESSMENT ADULT - LARYNGEAL PENETRATION DURING THE SWALLOW - SILENT
x1 instance of laryngeal penetration along the laryngeal surface of epiglottis, shallow, fully retrieved during the swallow

## 2024-09-30 NOTE — OCCUPATIONAL THERAPY INITIAL EVALUATION ADULT - DIAGNOSIS, OT EVAL
Pt presenting with new onset of weakness in hands/intrinsic muscles right>left impacting independence in adl's

## 2024-09-30 NOTE — SWALLOW VFSS/MBS ASSESSMENT ADULT - ORAL PHASE
Swallow triggered at pyriform sinuses (WFL for solids); Trace oral cavity residue Trace oral cavity residue Trace spillage to the AE folds, inconsistent

## 2024-09-30 NOTE — SWALLOW VFSS/MBS ASSESSMENT ADULT - PHARYNGEAL PHASE COMMENTS
Residue reduced w/ spontaneous repeat swallow Residue reduced with spontaneous repeat swallow Imiquimod Counseling:  I discussed with the patient the risks of imiquimod including but not limited to erythema, scaling, itching, weeping, crusting, and pain.  Patient understands that the inflammatory response to imiquimod is variable from person to person and was educated regarded proper titration schedule.  If flu-like symptoms develop, patient knows to discontinue the medication and contact us.

## 2024-09-30 NOTE — DISCHARGE NOTE NURSING/CASE MANAGEMENT/SOCIAL WORK - PATIENT PORTAL LINK FT
You can access the FollowMyHealth Patient Portal offered by [facility] by registering at the following website: [email or web address]. By joining [employer]’s FollowMyHealth portal, you will also be able to view your health information using other applications (apps) compatible with our system.

## 2024-09-30 NOTE — SWALLOW VFSS/MBS ASSESSMENT ADULT - SLP PRECAUTIONS/LIMITATIONS: VISION
within functional limits Doxycycline Pregnancy And Lactation Text: This medication is Pregnancy Category D and not consider safe during pregnancy. It is also excreted in breast milk but is considered safe for shorter treatment courses.

## 2024-09-30 NOTE — OCCUPATIONAL THERAPY INITIAL EVALUATION ADULT - PERTINENT HX OF CURRENT PROBLEM, REHAB EVAL
Pt is a 64 year old female with Hx GERD, osteoporosis/osteopenia, skin cancer s/p resection, who presents w/ CC of slurred speech  Pt with 3 months of progressively worsening dysarthria, slowed wolf, difficulty w/ tongue movements, difficulty chewing and swallowing. Exam notable for dysarthria w/ scanning speech, tongue and arm fasciculations, suggestive of progressive bulbar palsy, possibly ALS. Less likely myasthenia gravis vs infarct

## 2024-09-30 NOTE — SWALLOW VFSS/MBS ASSESSMENT ADULT - RECOMMENDED CONSISTENCY
Regular diet, thin liquids. Universal precautions: upright positioning, small bites/ sips, slow rate of intake, etc.

## 2024-09-30 NOTE — SWALLOW VFSS/MBS ASSESSMENT ADULT - ASPIRATION PRECAUTIONS
Monitor for s/s aspiration/laryngeal penetration. If noted:  D/C p.o. intake, provide non-oral nutrition/hydration/meds, and contact this service @ [email or web address]

## 2024-09-30 NOTE — DISCHARGE NOTE PROVIDER - CARE PROVIDERS DIRECT ADDRESSES
[email or web address]

## 2024-09-30 NOTE — DISCHARGE NOTE PROVIDER - HOSPITAL COURSE
HPI: Patient LESLEY GIRALDO is a 64y (1960) woman with a PMHx significant for osteoporosis, GERD, skin cancer s/p resection, who presents w/ CC of slurred speech. Patient's illness began 3 months ago and has been gradually getting worse. Onset was also gradual. She notices her speech is both slurred and slower in wolf. No word finding difficulties. No difficulty understanding others. Over the same time, she has noticed weakness in her hands (R>L), as well as difficulty chewing and swallowing (both foods and liquids). Food has gotten suck in her throat on occasion. She denies focal numbness, hearing or vision loss, headaches, difficulty walking, or dizziness. She is independent in ADLs.  to her . 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 She denies diplopia, difficulty breathing, or ptosis. Family history positive for CVA in mother, as well as Alzheimer's. No smoking or alcohol use. Only surgeries were 2x C-sections. She has 2 sons, one with schizoaffective d/o, the other with anxiety.    Hospital Course: Pt initially placed in CDU observation unit, where MRI was obtained, showing X. Pt was later admitted to general neurology floor. MBS was obtained, recommending X in terms of diet. HPI: Patient LESLEY GIRALDO is a 64y (1960) woman with a PMHx significant for osteoporosis, GERD, skin cancer s/p resection, who presents w/ CC of slurred speech. Patient's illness began 3 months ago and has been gradually getting worse. Onset was also gradual. She notices her speech is both slurred and slower in wolf. No word finding difficulties. No difficulty understanding others. Over the same time, she has noticed weakness in her hands (R>L), as well as difficulty chewing and swallowing (both foods and liquids). Food has gotten suck in her throat on occasion. She denies focal numbness, hearing or vision loss, headaches, difficulty walking, or dizziness. She is independent in ADLs.  to her . 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 She denies diplopia, difficulty breathing, or ptosis. Family history positive for CVA in mother, as well as Alzheimer's. No smoking or alcohol use. Only surgeries were 2x C-sections. She has 2 sons, one with schizoaffective d/o, the other with anxiety.    Hospital Course: Pt initially placed in CDU observation unit, where MRI was obtained, showing subtle T2/FLAIR linear hyperintensity in the corticospinal tracts bilaterally extending towards the precentral gyri Pt was later admitted to general neurology floor. MBS was obtained, recommending X in terms of diet. HPI: Patient LESLEY GIRALDO is a 64y (1960) woman with a PMHx significant for osteoporosis, GERD, skin cancer s/p resection, who presents w/ CC of slurred speech. Patient's illness began 3 months ago and has been gradually getting worse. Onset was also gradual. She notices her speech is both slurred and slower in wolf. No word finding difficulties. No difficulty understanding others. Over the same time, she has noticed weakness in her hands (R>L), as well as difficulty chewing and swallowing (both foods and liquids). Food has gotten suck in her throat on occasion. She denies focal numbness, hearing or vision loss, headaches, difficulty walking, or dizziness. She is independent in ADLs.  to her . 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 She denies diplopia, difficulty breathing, or ptosis. Family history positive for CVA in mother, as well as Alzheimer's. No smoking or alcohol use. Only surgeries were 2x C-sections. She has 2 sons, one with schizoaffective d/o, the other with anxiety.    Hospital Course: Pt initially placed in CDU observation unit, where MRI was obtained, showing subtle T2/FLAIR linear hyperintensity in the corticospinal tracts bilaterally extending towards the precentral gyri Pt was later admitted to general neurology floor. MBS was obtained, recommending X in terms of diet. Although there no specific diagnosis that has been established, there is clinical concern for ALS, as welll as Myasthenia Gravis (MG serum workup was drawn)     After discharge, pt should f/u outpatient with neuromuscular specialist Dr. Rickie Block for further evaluation, including EMG/NCS. HPI: Patient LESLEY GIRALDO is a 64y (1960) woman with a PMHx significant for osteoporosis, GERD, skin cancer s/p resection, who presents w/ CC of slurred speech. Patient's illness began 3 months ago and has been gradually getting worse. Onset was also gradual. She notices her speech is both slurred and slower in wolf. No word finding difficulties. No difficulty understanding others. Over the same time, she has noticed weakness in her hands (R>L), as well as difficulty chewing and swallowing (both foods and liquids). Food has gotten suck in her throat on occasion. She denies focal numbness, hearing or vision loss, headaches, difficulty walking, or dizziness. She is independent in ADLs.  to her . 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 She denies diplopia, difficulty breathing, or ptosis. Family history positive for CVA in mother, as well as Alzheimer's. No smoking or alcohol use. Only surgeries were 2x C-sections. She has 2 sons, one with schizoaffective d/o, the other with anxiety.    Hospital Course: Pt initially placed in CDU observation unit, where MRI was obtained, showing subtle T2/FLAIR linear hyperintensity in the corticospinal tracts bilaterally extending towards the precentral gyri Pt was later admitted to general neurology floor. MBS was obtained and pt evaluated by speech/swallow, pt is safe for regular diet, although will require outpatient speech and swallow follow up. Although there no specific diagnosis that has been established, there is clinical concern for ALS, as welll as Myasthenia Gravis (MG serum workup was drawn)     After discharge, pt should f/u outpatient with neuromuscular specialist Dr. Rickie Block for further evaluation, including EMG/NCS, as well as with speech language pathology. HPI: Patient LESLEY GIRALDO is a 64y (1960) woman with a PMHx significant for osteoporosis, GERD, skin cancer s/p resection, who presents w/ CC of slurred speech. Patient's illness began 3 months ago and has been gradually getting worse. Onset was also gradual. She notices her speech is both slurred and slower in wolf. No word finding difficulties. No difficulty understanding others. Over the same time, she has noticed weakness in her hands (R>L), as well as difficulty chewing and swallowing (both foods and liquids). Food has gotten suck in her throat on occasion. She denies focal numbness, hearing or vision loss, headaches, difficulty walking, or dizziness. She is independent in ADLs.  to her . 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 She denies diplopia, difficulty breathing, or ptosis. Family history positive for CVA in mother, as well as Alzheimer's. No smoking or alcohol use. Only surgeries were 2x C-sections. She has 2 sons, one with schizoaffective d/o, the other with anxiety.    Hospital Course: Pt initially placed in CDU observation unit, where MRI was obtained, showing subtle T2/FLAIR linear hyperintensity in the corticospinal tracts bilaterally extending towards the precentral gyri Pt was later admitted to general neurology floor. MBS was obtained and pt evaluated by speech/swallow, pt is safe for regular diet, although will require outpatient speech and swallow follow up. Although there no specific diagnosis that has been established, there is clinical concern for ALS, as welll as Myasthenia Gravis (MG serum workup was drawn)     After discharge, pt should f/u outpatient with neurologist Dr. Jean for further evaluation, including EMG/NCS, as well as with speech language pathology. HPI: Patient LESLEY GIRALDO is a 64y (1960) woman with a PMHx significant for osteoporosis, GERD, skin cancer s/p resection, who presents w/ CC of slurred speech. Patient's illness began 3 months ago and has been gradually getting worse. Onset was also gradual. She notices her speech is both slurred and slower in wolf. No word finding difficulties. No difficulty understanding others. Over the same time, she has noticed weakness in her hands (R>L), as well as difficulty chewing and swallowing (both foods and liquids). Food has gotten suck in her throat on occasion. She denies focal numbness, hearing or vision loss, headaches, difficulty walking, or dizziness. She is independent in ADLs.  to her . 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 She denies diplopia, difficulty breathing, or ptosis. Family history positive for CVA in mother, as well as Alzheimer's. No smoking or alcohol use. Only surgeries were 2x C-sections. She has 2 sons, one with schizoaffective d/o, the other with anxiety.    Hospital Course: Pt initially placed in CDU observation unit, where MRI was obtained, showing subtle T2/FLAIR linear hyperintensity in the corticospinal tracts bilaterally extending towards the precentral gyri. Pt was later admitted to general neurology floor for further evaluation. MBS was obtained and pt evaluated by speech/swallow, pt is safe for regular diet, although will require outpatient speech and swallow follow up. In addition to aforementioned speech difficulties (dysarthria and scanning speech), pt also noted on examination to have perservation and trailing off during conversation, as well as  noticing change in personality and paranoid ideation. Although there no specific diagnosis that has been established, there is clinical concern for ALS, as welll as Myasthenia Gravis (MG serum workup was drawn)     After discharge, pt should f/u outpatient with neurologist Dr. Jean for further evaluation, including EMG/NCS, as well as with speech language pathology. HPI: Patient LESLEY GIRALDO is a 64y (1960) woman with a PMHx significant for osteoporosis, GERD, skin cancer s/p resection, who presents w/ CC of slurred speech. Patient's illness began 3 months ago and has been gradually getting worse. Onset was also gradual. She notices her speech is both slurred and slower in wolf. No word finding difficulties. No difficulty understanding others. Over the same time, she has noticed weakness in her hands (R>L), as well as difficulty chewing and swallowing (both foods and liquids). Food has gotten suck in her throat on occasion. She denies focal numbness, hearing or vision loss, headaches, difficulty walking, or dizziness. She is independent in ADLs.  to her . 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 She denies diplopia, difficulty breathing, or ptosis. Family history positive for CVA in mother, as well as Alzheimer's. No smoking or alcohol use. Only surgeries were 2x C-sections. She has 2 sons, one with schizoaffective d/o, the other with anxiety.    Hospital Course: Pt initially placed in CDU observation unit, where MRI was obtained, showing subtle T2/FLAIR linear hyperintensity in the corticospinal tracts bilaterally extending towards the precentral gyri. Pt was later admitted to general neurology floor for further evaluation. MBS was obtained and pt evaluated by speech/swallow, pt is safe for regular diet, although will require outpatient speech and swallow follow up. In addition to aforementioned speech difficulties (dysarthria and scanning speech), pt also noted on examination to have perservation and trailing off during conversation, as well as  noticing change in personality and paranoid ideation. Although there no specific diagnosis that has been established, there is clinical concern for ALS, as welll as Myasthenia Gravis (MG serum workup was drawn)     After discharge, pt should f/u outpatient with neurologist Dr. Jean for further evaluation, including EMG/NCS, as well as with speech language pathology and Occupational Therapy

## 2024-09-30 NOTE — SBIRT NOTE ADULT - NSSBIRTBRIEFINTDET_GEN_A_CORE
LMSW met with patient to complete SBIRT, explore alcohol use, and explore how alcohol has an effect on her daily life. Patient stated she had stopped drinking weeks ago as she is not sure if alcohol is causing or exacerbating her current neurological issues. Patient denied needing substance use resources at this time.

## 2024-09-30 NOTE — OCCUPATIONAL THERAPY INITIAL EVALUATION ADULT - ADDITIONAL COMMENTS
Pt reports she lives with her souse in a private house. 2 steps to enter, 1 fos to bedroom +stall shower, pt works in basement. PTA pt independent in adl's and functional mobility however some tasks requiring hand strength becoming increasingly more difficult

## 2024-09-30 NOTE — DISCHARGE NOTE PROVIDER - NSDCCPTREATMENT_GEN_ALL_CORE_FT
PRINCIPAL PROCEDURE  Procedure: MRI brain  Findings and Treatment: there is subtle T2/FLAIR linear   hyperintensity in the corticospinal tracts bilaterally extending towards   the precentral gyri, which is nonspecific, although may be seen with   motor neuron diseases such as amyotrophic lateral sclerosis, primary   lateral sclerosis, or alternatively, a variety of metabolic disease. This   is also appreciated on T2 coronal series 17, image 19. Correlate with   patient history and clinical examination findings.

## 2024-09-30 NOTE — DISCHARGE NOTE PROVIDER - CARE PROVIDER_API CALL
Rickie Block  Neurology  [address], [address]  [place] [address]  Phone: [phone]  Fax: [phone]  Follow Up Time: 2 weeks   Arnaldo Jean  Neurology  [address], [address]  [place] [address]  Phone: [phone]  Fax: [phone]  Follow Up Time:

## 2024-09-30 NOTE — DISCHARGE NOTE PROVIDER - NSDCMRMEDTOKEN_GEN_ALL_CORE_FT
Speech Language Pathologist: Speech and swallow therapy by speech language pathologist   Outpatient Occupational Therapy: Outpatient Occupational Therapy   Outpatient Occupational Therapy: Outpatient Occupational Therapy MDD: 0

## 2024-10-01 LAB
% ALBUMIN: 62.9 % — SIGNIFICANT CHANGE UP
% ALPHA 1: 4 % — SIGNIFICANT CHANGE UP
% ALPHA 2: 10.6 % — SIGNIFICANT CHANGE UP
% BETA: 11.4 % — SIGNIFICANT CHANGE UP
% GAMMA: 11.1 % — SIGNIFICANT CHANGE UP
ACE SERPL-CCNC: 25 U/L — SIGNIFICANT CHANGE UP (ref 14–82)
ACRM BINDING ANTIBODY: <0.03 NMOL/L — SIGNIFICANT CHANGE UP (ref 0–0.24)
ALBUMIN SERPL ELPH-MCNC: 3.9 G/DL — SIGNIFICANT CHANGE UP (ref 3.6–5.5)
ALBUMIN/GLOB SERPL ELPH: 1.7 RATIO — SIGNIFICANT CHANGE UP
ALPHA1 GLOB SERPL ELPH-MCNC: 0.2 G/DL — SIGNIFICANT CHANGE UP (ref 0.1–0.4)
ALPHA2 GLOB SERPL ELPH-MCNC: 0.7 G/DL — SIGNIFICANT CHANGE UP (ref 0.5–1)
B BURGDOR C6 AB SER-ACNC: NEGATIVE — SIGNIFICANT CHANGE UP
B BURGDOR IGG+IGM SER-ACNC: 0.07 INDEX — SIGNIFICANT CHANGE UP (ref 0.01–0.9)
B-GLOBULIN SERPL ELPH-MCNC: 0.7 G/DL — SIGNIFICANT CHANGE UP (ref 0.5–1)
GAMMA GLOBULIN: 0.7 G/DL — SIGNIFICANT CHANGE UP (ref 0.6–1.6)
PROT PATTERN SERPL ELPH-IMP: SIGNIFICANT CHANGE UP
PROT SERPL-MCNC: 6.2 G/DL — SIGNIFICANT CHANGE UP (ref 6–8.3)

## 2024-10-02 ENCOUNTER — RX RENEWAL (OUTPATIENT)
Age: 64
End: 2024-10-02

## 2024-10-02 LAB
ACHR MOD AB SER-ACNC: 10 % — SIGNIFICANT CHANGE UP (ref 0–45)
ANA TITR SER: NEGATIVE — SIGNIFICANT CHANGE UP

## 2024-10-03 LAB — ACHR BLOCK AB SER-ACNC: 19 % — SIGNIFICANT CHANGE UP (ref 0–25)

## 2024-10-04 ENCOUNTER — TRANSCRIPTION ENCOUNTER (OUTPATIENT)
Age: 64
End: 2024-10-04

## 2024-10-04 PROBLEM — Z78.9 OTHER SPECIFIED HEALTH STATUS: Chronic | Status: ACTIVE | Noted: 2024-09-28

## 2024-10-04 LAB — MUSK IGG SER IA-MCNC: SIGNIFICANT CHANGE UP

## 2024-10-07 ENCOUNTER — APPOINTMENT (OUTPATIENT)
Dept: NEUROLOGY | Facility: CLINIC | Age: 64
End: 2024-10-07
Payer: COMMERCIAL

## 2024-10-07 VITALS — SYSTOLIC BLOOD PRESSURE: 120 MMHG | DIASTOLIC BLOOD PRESSURE: 80 MMHG | HEART RATE: 72 BPM

## 2024-10-07 PROCEDURE — 95887 MUSC TST DONE W/N TST NONEXT: CPT

## 2024-10-07 PROCEDURE — 95885 MUSC TST DONE W/NERV TST LIM: CPT

## 2024-10-07 PROCEDURE — 95911 NRV CNDJ TEST 9-10 STUDIES: CPT

## 2024-10-07 RX ORDER — RILUZOLE 50 MG/1
50 TABLET, FILM COATED ORAL
Qty: 180 | Refills: 3 | Status: ACTIVE | COMMUNITY
Start: 2024-10-07 | End: 1900-01-01

## 2024-10-08 DIAGNOSIS — G12.20 MOTOR NEURON DISEASE, UNSPECIFIED: ICD-10-CM

## 2024-10-09 ENCOUNTER — LABORATORY RESULT (OUTPATIENT)
Age: 64
End: 2024-10-09

## 2024-10-09 RX ORDER — RILUZOLE 50 MG/1
50 TABLET, FILM COATED ORAL
Qty: 180 | Refills: 3 | Status: ACTIVE | COMMUNITY
Start: 2024-10-09 | End: 1900-01-01

## 2024-10-10 LAB — T PALLIDUM AB SER QL IA: NEGATIVE

## 2024-10-10 RX ORDER — EDARAVONE 105 MG/5ML
105 KIT ORAL
Qty: 2 | Refills: 0 | Status: ACTIVE | COMMUNITY
Start: 2024-10-07

## 2024-10-10 RX ORDER — EDARAVONE 105 MG/5ML
105 KIT ORAL
Qty: 1 | Refills: 5 | Status: ACTIVE | COMMUNITY
Start: 2024-10-09

## 2024-10-11 LAB
HTLV I+II AB SER QL: NEGATIVE
IGA 24H UR QL IFE: NORMAL

## 2024-10-11 RX ORDER — EDARAVONE 105 MG/5ML
105 KIT ORAL
Qty: 2 | Refills: 0 | Status: ACTIVE | COMMUNITY
Start: 2024-10-11 | End: 1900-01-01

## 2024-10-11 RX ORDER — EDARAVONE 105 MG/5ML
105 KIT ORAL
Qty: 1 | Refills: 11 | Status: ACTIVE | COMMUNITY
Start: 2024-10-11 | End: 1900-01-01

## 2024-10-12 LAB
ALBUMIN MFR SERPL ELPH: 64 %
ALBUMIN SERPL-MCNC: 4.5 G/DL
ALBUMIN/GLOB SERPL: 1.7 RATIO
ALPHA1 GLOB MFR SERPL ELPH: 3.5 %
ALPHA1 GLOB SERPL ELPH-MCNC: 0.2 G/DL
ALPHA2 GLOB MFR SERPL ELPH: 10.1 %
ALPHA2 GLOB SERPL ELPH-MCNC: 0.7 G/DL
ASIALO-GM1 ANTIBODIES, IGG/IGM: 9 IV
B-GLOBULIN MFR SERPL ELPH: 11.1 %
B-GLOBULIN SERPL ELPH-MCNC: 0.8 G/DL
DEPRECATED KAPPA LC FREE/LAMBDA SER: 1.78 RATIO
GAMMA GLOB FLD ELPH-MCNC: 0.8 G/DL
GAMMA GLOB MFR SERPL ELPH: 11.3 %
GD1A ANTIBODIES, IGG/IGM: 15 IV
GD1B ANTIBODIES, IGG/IGM: 15 IV
GM1 ANTIBODIES, IGG/IGM: 8 IV
GM2 ANTIBODIES, IGG/IGM: 22 IV
GQ1B ANTIBODIES, IGG/IGM: 6 IV
IGA SER QL IEP: 140 MG/DL
IGG SER QL IEP: 759 MG/DL
IGM SER QL IEP: 72 MG/DL
INTERPRETATION SERPL IEP-IMP: NORMAL
KAPPA LC CSF-MCNC: 0.46 MG/DL
KAPPA LC SERPL-MCNC: 0.82 MG/DL
M PROTEIN SPEC IFE-MCNC: NORMAL
PROT SERPL-MCNC: 7.1 G/DL
PROT SERPL-MCNC: 7.1 G/DL

## 2024-10-13 LAB
A-TOCOPHEROL VIT E SERPL-MCNC: 16.5 MG/L
BETA+GAMMA TOCOPHEROL SERPL-MCNC: 1.6 MG/L

## 2024-10-14 LAB — ANACR T: NEGATIVE

## 2024-10-16 ENCOUNTER — TRANSCRIPTION ENCOUNTER (OUTPATIENT)
Age: 64
End: 2024-10-16

## 2024-10-16 LAB
ARSENIC BLD-MCNC: 1 UG/L
CADMIUM SERPL-MCNC: <0.5 UG/L
LEAD BLD-MCNC: <1 UG/DL
MERCURY BLD-MCNC: 1.3 UG/L

## 2024-10-17 ENCOUNTER — TRANSCRIPTION ENCOUNTER (OUTPATIENT)
Age: 64
End: 2024-10-17

## 2024-10-21 DIAGNOSIS — G12.20 MOTOR NEURON DISEASE, UNSPECIFIED: ICD-10-CM

## 2024-10-24 ENCOUNTER — NON-APPOINTMENT (OUTPATIENT)
Age: 64
End: 2024-10-24

## 2024-10-30 ENCOUNTER — TRANSCRIPTION ENCOUNTER (OUTPATIENT)
Age: 64
End: 2024-10-30

## 2024-11-05 ENCOUNTER — TRANSCRIPTION ENCOUNTER (OUTPATIENT)
Age: 64
End: 2024-11-05

## 2024-11-06 ENCOUNTER — NON-APPOINTMENT (OUTPATIENT)
Age: 64
End: 2024-11-06

## 2024-11-08 ENCOUNTER — APPOINTMENT (OUTPATIENT)
Dept: NEUROLOGY | Facility: CLINIC | Age: 64
End: 2024-11-08

## 2024-11-08 VITALS
WEIGHT: 131 LBS | DIASTOLIC BLOOD PRESSURE: 85 MMHG | HEART RATE: 81 BPM | HEIGHT: 62.5 IN | SYSTOLIC BLOOD PRESSURE: 141 MMHG | BODY MASS INDEX: 23.5 KG/M2

## 2024-11-08 PROCEDURE — 97162 PT EVAL MOD COMPLEX 30 MIN: CPT

## 2024-11-08 PROCEDURE — 92610 EVALUATE SWALLOWING FUNCTION: CPT

## 2024-11-08 PROCEDURE — 99205 OFFICE O/P NEW HI 60 MIN: CPT | Mod: 25

## 2024-11-08 PROCEDURE — 97165 OT EVAL LOW COMPLEX 30 MIN: CPT

## 2024-11-12 ENCOUNTER — NON-APPOINTMENT (OUTPATIENT)
Age: 64
End: 2024-11-12

## 2024-11-15 ENCOUNTER — RX RENEWAL (OUTPATIENT)
Age: 64
End: 2024-11-15

## 2024-11-27 ENCOUNTER — NON-APPOINTMENT (OUTPATIENT)
Age: 64
End: 2024-11-27

## 2024-12-18 ENCOUNTER — NON-APPOINTMENT (OUTPATIENT)
Age: 64
End: 2024-12-18

## 2024-12-19 ENCOUNTER — TRANSCRIPTION ENCOUNTER (OUTPATIENT)
Age: 64
End: 2024-12-19

## 2024-12-20 ENCOUNTER — APPOINTMENT (OUTPATIENT)
Dept: OTOLARYNGOLOGY | Facility: CLINIC | Age: 64
End: 2024-12-20
Payer: COMMERCIAL

## 2024-12-20 ENCOUNTER — NON-APPOINTMENT (OUTPATIENT)
Age: 64
End: 2024-12-20

## 2024-12-20 PROCEDURE — 92607 EX FOR SPEECH DEVICE RX 1HR: CPT | Mod: GN

## 2024-12-24 ENCOUNTER — TRANSCRIPTION ENCOUNTER (OUTPATIENT)
Age: 64
End: 2024-12-24

## 2024-12-27 ENCOUNTER — TRANSCRIPTION ENCOUNTER (OUTPATIENT)
Age: 64
End: 2024-12-27

## 2024-12-27 ENCOUNTER — NON-APPOINTMENT (OUTPATIENT)
Age: 64
End: 2024-12-27

## 2025-01-13 ENCOUNTER — TRANSCRIPTION ENCOUNTER (OUTPATIENT)
Age: 65
End: 2025-01-13

## 2025-02-06 ENCOUNTER — APPOINTMENT (OUTPATIENT)
Dept: INTERNAL MEDICINE | Facility: CLINIC | Age: 65
End: 2025-02-06
Payer: COMMERCIAL

## 2025-02-06 ENCOUNTER — NON-APPOINTMENT (OUTPATIENT)
Age: 65
End: 2025-02-06

## 2025-02-06 VITALS — SYSTOLIC BLOOD PRESSURE: 118 MMHG | DIASTOLIC BLOOD PRESSURE: 80 MMHG

## 2025-02-06 VITALS — WEIGHT: 129 LBS | HEIGHT: 62.5 IN | BODY MASS INDEX: 23.14 KG/M2

## 2025-02-06 DIAGNOSIS — G12.20 MOTOR NEURON DISEASE, UNSPECIFIED: ICD-10-CM

## 2025-02-06 DIAGNOSIS — Z00.00 ENCOUNTER FOR GENERAL ADULT MEDICAL EXAMINATION W/OUT ABNORMAL FINDINGS: ICD-10-CM

## 2025-02-06 DIAGNOSIS — R73.03 PREDIABETES.: ICD-10-CM

## 2025-02-06 DIAGNOSIS — M81.0 AGE-RELATED OSTEOPOROSIS W/OUT CURRENT PATHOLOGICAL FRACTURE: ICD-10-CM

## 2025-02-06 DIAGNOSIS — R07.81 PLEURODYNIA: ICD-10-CM

## 2025-02-06 DIAGNOSIS — E78.9 DISORDER OF LIPOPROTEIN METABOLISM, UNSPECIFIED: ICD-10-CM

## 2025-02-06 DIAGNOSIS — I10 ESSENTIAL (PRIMARY) HYPERTENSION: ICD-10-CM

## 2025-02-06 DIAGNOSIS — M65.321 TRIGGER FINGER, RIGHT INDEX FINGER: ICD-10-CM

## 2025-02-06 DIAGNOSIS — D03.9 MELANOMA IN SITU, UNSPECIFIED: ICD-10-CM

## 2025-02-06 PROCEDURE — 36415 COLL VENOUS BLD VENIPUNCTURE: CPT

## 2025-02-06 PROCEDURE — 99213 OFFICE O/P EST LOW 20 MIN: CPT | Mod: 25

## 2025-02-06 PROCEDURE — 99396 PREV VISIT EST AGE 40-64: CPT

## 2025-02-06 PROCEDURE — 93000 ELECTROCARDIOGRAM COMPLETE: CPT

## 2025-02-07 ENCOUNTER — APPOINTMENT (OUTPATIENT)
Dept: NEUROLOGY | Facility: CLINIC | Age: 65
End: 2025-02-07
Payer: COMMERCIAL

## 2025-02-07 VITALS
DIASTOLIC BLOOD PRESSURE: 85 MMHG | HEART RATE: 83 BPM | WEIGHT: 131 LBS | BODY MASS INDEX: 23.5 KG/M2 | HEIGHT: 62.5 IN | SYSTOLIC BLOOD PRESSURE: 148 MMHG

## 2025-02-07 DIAGNOSIS — R13.19 OTHER DYSPHAGIA: ICD-10-CM

## 2025-02-07 DIAGNOSIS — R47.1 DYSARTHRIA AND ANARTHRIA: ICD-10-CM

## 2025-02-07 DIAGNOSIS — G12.21 AMYOTROPHIC LATERAL SCLEROSIS: ICD-10-CM

## 2025-02-07 LAB
25(OH)D3 SERPL-MCNC: 30.7 NG/ML
ALBUMIN SERPL ELPH-MCNC: 4.7 G/DL
ALP BLD-CCNC: 80 U/L
ALT SERPL-CCNC: 49 U/L
ANION GAP SERPL CALC-SCNC: 10 MMOL/L
APPEARANCE: CLEAR
AST SERPL-CCNC: 29 U/L
BASOPHILS # BLD AUTO: 0.03 K/UL
BASOPHILS NFR BLD AUTO: 0.6 %
BILIRUB SERPL-MCNC: 0.5 MG/DL
BILIRUBIN URINE: NEGATIVE
BLOOD URINE: NEGATIVE
BUN SERPL-MCNC: 9 MG/DL
CALCIUM SERPL-MCNC: 9.8 MG/DL
CHLORIDE SERPL-SCNC: 105 MMOL/L
CHOLEST SERPL-MCNC: 226 MG/DL
CK SERPL-CCNC: 169 U/L
CO2 SERPL-SCNC: 29 MMOL/L
COLOR: YELLOW
CREAT SERPL-MCNC: 0.55 MG/DL
EGFR: 102 ML/MIN/1.73M2
EOSINOPHIL # BLD AUTO: 0.06 K/UL
EOSINOPHIL NFR BLD AUTO: 1.2 %
ESTIMATED AVERAGE GLUCOSE: 97 MG/DL
GLUCOSE QUALITATIVE U: NEGATIVE MG/DL
GLUCOSE SERPL-MCNC: 108 MG/DL
HBA1C MFR BLD HPLC: 5 %
HCT VFR BLD CALC: 43.2 %
HDLC SERPL-MCNC: 64 MG/DL
HGB BLD-MCNC: 13.2 G/DL
IMM GRANULOCYTES NFR BLD AUTO: 0.2 %
KETONES URINE: NEGATIVE MG/DL
LDLC SERPL CALC-MCNC: 139 MG/DL
LEUKOCYTE ESTERASE URINE: NEGATIVE
LYMPHOCYTES # BLD AUTO: 1.61 K/UL
LYMPHOCYTES NFR BLD AUTO: 33.1 %
MAN DIFF?: NORMAL
MCHC RBC-ENTMCNC: 28.3 PG
MCHC RBC-ENTMCNC: 30.6 G/DL
MCV RBC AUTO: 92.7 FL
MONOCYTES # BLD AUTO: 0.45 K/UL
MONOCYTES NFR BLD AUTO: 9.3 %
NEUTROPHILS # BLD AUTO: 2.7 K/UL
NEUTROPHILS NFR BLD AUTO: 55.6 %
NITRITE URINE: NEGATIVE
NONHDLC SERPL-MCNC: 162 MG/DL
PH URINE: 7.5
PLATELET # BLD AUTO: 335 K/UL
POTASSIUM SERPL-SCNC: 4.9 MMOL/L
PROT SERPL-MCNC: 6.9 G/DL
PROTEIN URINE: NEGATIVE MG/DL
RBC # BLD: 4.66 M/UL
RBC # FLD: 12.4 %
SODIUM SERPL-SCNC: 144 MMOL/L
SPECIFIC GRAVITY URINE: 1.01
T4 FREE SERPL-MCNC: 1.2 NG/DL
TRIGL SERPL-MCNC: 133 MG/DL
TSH SERPL-ACNC: 2.69 UIU/ML
UROBILINOGEN URINE: 0.2 MG/DL
VIT B12 SERPL-MCNC: 584 PG/ML
WBC # FLD AUTO: 4.86 K/UL

## 2025-02-07 PROCEDURE — 99205 OFFICE O/P NEW HI 60 MIN: CPT | Mod: 25

## 2025-02-07 PROCEDURE — 99417 PROLNG OP E/M EACH 15 MIN: CPT

## 2025-02-07 PROCEDURE — 92610 EVALUATE SWALLOWING FUNCTION: CPT

## 2025-02-07 PROCEDURE — 97166 OT EVAL MOD COMPLEX 45 MIN: CPT

## 2025-02-07 PROCEDURE — 97163 PT EVAL HIGH COMPLEX 45 MIN: CPT

## 2025-02-07 RX ORDER — PNEUMOCOCCAL VACCINE POLYVALENT 25; 25; 25; 25; 25; 25; 25; 25; 25; 25; 25; 25; 25; 25; 25; 25; 25; 25; 25; 25; 25; 25; 25 UG/.5ML; UG/.5ML; UG/.5ML; UG/.5ML; UG/.5ML; UG/.5ML; UG/.5ML; UG/.5ML; UG/.5ML; UG/.5ML; UG/.5ML; UG/.5ML; UG/.5ML; UG/.5ML; UG/.5ML; UG/.5ML; UG/.5ML; UG/.5ML; UG/.5ML; UG/.5ML; UG/.5ML; UG/.5ML; UG/.5ML
25 INJECTION, SOLUTION INTRAMUSCULAR; SUBCUTANEOUS ONCE
Qty: 1 | Refills: 0 | Status: ACTIVE | COMMUNITY
Start: 2025-02-07 | End: 1900-01-01

## 2025-02-10 PROBLEM — G12.21 ALS (AMYOTROPHIC LATERAL SCLEROSIS): Status: ACTIVE | Noted: 2025-02-06

## 2025-02-10 PROBLEM — R47.1 DYSARTHRIA AND ANARTHRIA: Status: ACTIVE | Noted: 2025-02-10

## 2025-02-10 PROBLEM — R13.19 DYSPHAGIA, NEUROLOGIC: Status: ACTIVE | Noted: 2025-02-10

## 2025-02-12 ENCOUNTER — APPOINTMENT (OUTPATIENT)
Dept: NEUROLOGY | Facility: CLINIC | Age: 65
End: 2025-02-12

## 2025-02-13 ENCOUNTER — NON-APPOINTMENT (OUTPATIENT)
Age: 65
End: 2025-02-13

## 2025-05-16 ENCOUNTER — APPOINTMENT (OUTPATIENT)
Dept: NEUROLOGY | Facility: CLINIC | Age: 65
End: 2025-05-16

## 2025-05-16 VITALS
BODY MASS INDEX: 23.86 KG/M2 | HEIGHT: 62.5 IN | SYSTOLIC BLOOD PRESSURE: 183 MMHG | WEIGHT: 133 LBS | DIASTOLIC BLOOD PRESSURE: 91 MMHG | HEART RATE: 68 BPM | OXYGEN SATURATION: 95 %

## 2025-05-16 PROCEDURE — 97166 OT EVAL MOD COMPLEX 45 MIN: CPT

## 2025-05-16 PROCEDURE — 99215 OFFICE O/P EST HI 40 MIN: CPT | Mod: 25

## 2025-05-16 PROCEDURE — 92610 EVALUATE SWALLOWING FUNCTION: CPT

## 2025-05-16 PROCEDURE — 99417 PROLNG OP E/M EACH 15 MIN: CPT

## 2025-05-16 PROCEDURE — 97163 PT EVAL HIGH COMPLEX 45 MIN: CPT

## 2025-05-23 ENCOUNTER — NON-APPOINTMENT (OUTPATIENT)
Age: 65
End: 2025-05-23

## 2025-05-27 PROBLEM — K11.7 SIALORRHEA: Status: ACTIVE | Noted: 2025-05-27

## 2025-06-10 ENCOUNTER — TRANSCRIPTION ENCOUNTER (OUTPATIENT)
Age: 65
End: 2025-06-10

## 2025-06-12 ENCOUNTER — NON-APPOINTMENT (OUTPATIENT)
Age: 65
End: 2025-06-12

## 2025-07-02 ENCOUNTER — TRANSCRIPTION ENCOUNTER (OUTPATIENT)
Age: 65
End: 2025-07-02

## 2025-08-12 ENCOUNTER — APPOINTMENT (OUTPATIENT)
Dept: OBGYN | Facility: CLINIC | Age: 65
End: 2025-08-12
Payer: COMMERCIAL

## 2025-08-12 VITALS
BODY MASS INDEX: 23.74 KG/M2 | WEIGHT: 134 LBS | HEIGHT: 63 IN | HEART RATE: 70 BPM | SYSTOLIC BLOOD PRESSURE: 133 MMHG | DIASTOLIC BLOOD PRESSURE: 86 MMHG

## 2025-08-12 DIAGNOSIS — Z01.419 ENCOUNTER FOR GYNECOLOGICAL EXAMINATION (GENERAL) (ROUTINE) W/OUT ABNORMAL FINDINGS: ICD-10-CM

## 2025-08-12 PROCEDURE — 99387 INIT PM E/M NEW PAT 65+ YRS: CPT

## 2025-08-15 LAB — CYTOLOGY CVX/VAG DOC THIN PREP: ABNORMAL

## 2025-09-12 ENCOUNTER — APPOINTMENT (OUTPATIENT)
Dept: NEUROLOGY | Facility: CLINIC | Age: 65
End: 2025-09-12
Payer: COMMERCIAL

## 2025-09-12 VITALS
HEIGHT: 63 IN | HEART RATE: 76 BPM | SYSTOLIC BLOOD PRESSURE: 132 MMHG | WEIGHT: 129 LBS | BODY MASS INDEX: 22.86 KG/M2 | DIASTOLIC BLOOD PRESSURE: 86 MMHG

## 2025-09-12 DIAGNOSIS — R47.1 DYSARTHRIA AND ANARTHRIA: ICD-10-CM

## 2025-09-12 DIAGNOSIS — K11.7 DISTURBANCES OF SALIVARY SECRETION: ICD-10-CM

## 2025-09-12 DIAGNOSIS — G12.29 OTHER MOTOR NEURON DISEASE: ICD-10-CM

## 2025-09-12 DIAGNOSIS — R13.19 OTHER DYSPHAGIA: ICD-10-CM

## 2025-09-12 DIAGNOSIS — G12.21 AMYOTROPHIC LATERAL SCLEROSIS: ICD-10-CM

## 2025-09-12 PROCEDURE — 99215 OFFICE O/P EST HI 40 MIN: CPT | Mod: 25

## 2025-09-12 PROCEDURE — 97163 PT EVAL HIGH COMPLEX 45 MIN: CPT

## 2025-09-12 PROCEDURE — 92610 EVALUATE SWALLOWING FUNCTION: CPT

## 2025-09-12 PROCEDURE — 99417 PROLNG OP E/M EACH 15 MIN: CPT

## 2025-09-12 PROCEDURE — 97166 OT EVAL MOD COMPLEX 45 MIN: CPT

## 2025-09-15 ENCOUNTER — NON-APPOINTMENT (OUTPATIENT)
Age: 65
End: 2025-09-15

## 2025-09-15 PROBLEM — G12.29 PSEUDOBULBAR PALSY: Status: ACTIVE | Noted: 2025-09-15
